# Patient Record
Sex: MALE | Race: WHITE | NOT HISPANIC OR LATINO | URBAN - METROPOLITAN AREA
[De-identification: names, ages, dates, MRNs, and addresses within clinical notes are randomized per-mention and may not be internally consistent; named-entity substitution may affect disease eponyms.]

---

## 2017-04-25 ENCOUNTER — OUTPATIENT (OUTPATIENT)
Dept: OUTPATIENT SERVICES | Facility: HOSPITAL | Age: 62
LOS: 1 days | Discharge: HOME | End: 2017-04-25

## 2017-04-25 DIAGNOSIS — Z90.49 ACQUIRED ABSENCE OF OTHER SPECIFIED PARTS OF DIGESTIVE TRACT: Chronic | ICD-10-CM

## 2017-04-25 DIAGNOSIS — I25.10 ATHEROSCLEROTIC HEART DISEASE OF NATIVE CORONARY ARTERY WITHOUT ANGINA PECTORIS: ICD-10-CM

## 2017-04-25 LAB
ANION GAP SERPL CALC-SCNC: 10 MMOL/L — SIGNIFICANT CHANGE UP (ref 5–17)
BUN SERPL-MCNC: 10 MG/DL — SIGNIFICANT CHANGE UP (ref 7–23)
CALCIUM SERPL-MCNC: 9 MG/DL — SIGNIFICANT CHANGE UP (ref 8.5–10.1)
CHLORIDE SERPL-SCNC: 104 MMOL/L — SIGNIFICANT CHANGE UP (ref 96–108)
CO2 SERPL-SCNC: 27 MMOL/L — SIGNIFICANT CHANGE UP (ref 22–31)
CREAT SERPL-MCNC: 1.02 MG/DL — SIGNIFICANT CHANGE UP (ref 0.5–1.3)
GLUCOSE SERPL-MCNC: 80 MG/DL — SIGNIFICANT CHANGE UP (ref 70–99)
HCT VFR BLD CALC: 45.7 % — SIGNIFICANT CHANGE UP (ref 39–50)
HGB BLD-MCNC: 14.4 G/DL — SIGNIFICANT CHANGE UP (ref 13–17)
MCHC RBC-ENTMCNC: 20.7 PG — LOW (ref 27–34)
MCHC RBC-ENTMCNC: 31.5 GM/DL — LOW (ref 32–36)
MCV RBC AUTO: 65.9 FL — LOW (ref 80–100)
PLATELET # BLD AUTO: 223 K/UL — SIGNIFICANT CHANGE UP (ref 150–400)
POTASSIUM SERPL-MCNC: 3.9 MMOL/L — SIGNIFICANT CHANGE UP (ref 3.5–5.3)
POTASSIUM SERPL-SCNC: 3.9 MMOL/L — SIGNIFICANT CHANGE UP (ref 3.5–5.3)
RBC # BLD: 6.94 M/UL — HIGH (ref 4.2–5.8)
RBC # FLD: 13.5 % — SIGNIFICANT CHANGE UP (ref 10.3–14.5)
SODIUM SERPL-SCNC: 141 MMOL/L — SIGNIFICANT CHANGE UP (ref 135–145)
WBC # BLD: 9.3 K/UL — SIGNIFICANT CHANGE UP (ref 3.8–10.5)
WBC # FLD AUTO: 9.3 K/UL — SIGNIFICANT CHANGE UP (ref 3.8–10.5)

## 2017-04-25 NOTE — H&P CARDIOLOGY - PMH
COPD (chronic obstructive pulmonary disease)    Diverticulitis    Gout    Kidney stone    Sleep apnea

## 2017-04-25 NOTE — H&P CARDIOLOGY - HISTORY OF PRESENT ILLNESS
61yr old male PMH COPD, gout, sleep apnea 61yr old male PMH COPD, gout, sleep apnea with c/o left sided chest pain upon exertion. Symptom is  accompanied with dyspnea and palpitations. Pt. reports that this occurs when he walks uphill it feels like "burning across his chest" and subsides with rest. Pt. had a nuclear stress test which was found to be abnormal. Pt. now referred for [facility] for further evaluation. Pt. denies diaphoresis, lightheadedness, dizziness, N&V, chills, fever

## 2017-04-25 NOTE — H&P CARDIOLOGY - FAMILY HISTORY
Father  Still living? No  Family history of ischemic heart disease (IHD), Age at diagnosis: Age Unknown

## 2017-04-25 NOTE — H&P CARDIOLOGY - REVIEW OF SYSTEMS
CONSTITUTIONAL: No fever, weight loss, or fatigue  EYES: No eye pain, visual disturbances, or discharge  ENMT:  No difficulty hearing, tinnitus, vertigo; No sinus or throat pain  NECK: No pain or stiffness  BREASTS: No pain, masses, or nipple discharge  RESPIRATORY: + shortness of breath, dyspnea  CARDIOVASCULAR:+ chest pain, palpitations  GASTROINTESTINAL: No abdominal or epigastric pain. No nausea, vomiting, or hematemesis; No diarrhea or constipation. No melena or hematochezia.  GENITOURINARY: No dysuria, frequency, hematuria, or incontinence  NEUROLOGICAL: No headaches, memory loss, loss of strength, numbness, or tremors  SKIN: No itching, burning, rashes, or lesions   LYMPH NODES: No enlarged glands  ENDOCRINE: No heat or cold intolerance; No hair loss  MUSCULOSKELETAL: No joint pain or swelling; No muscle, back, or extremity pain  PSYCHIATRIC: No depression, anxiety, mood swings, or difficulty sleeping  HEME/LYMPH: No easy bruising, or bleeding gums  ALLERGY AND IMMUNOLOGIC: No hives or eczema

## 2017-04-25 NOTE — H&P CARDIOLOGY - COMMENTS
61yr old male PMH COPD, gout, sleep apnea with c/o left sided chest pain upon exertion. Symptom is  accompanied with dyspnea and palpitations. Pt. had a nuclear stress test which was found to be abnormal. Pt. now referred for C for further evaluation.  C risks, benefits and alternatives discussed with patient. Risk discussed included, but not limited to MI, stroke, mortality, major bleeding, arrythmia, or infection. Consent obtained and signed educational material provided. Pt. verbalizes and understands pre-procedural instructions.

## 2017-04-27 ENCOUNTER — OUTPATIENT (OUTPATIENT)
Dept: OUTPATIENT SERVICES | Facility: HOSPITAL | Age: 62
LOS: 1 days | Discharge: HOME | End: 2017-04-27

## 2017-04-27 VITALS
HEART RATE: 64 BPM | TEMPERATURE: 97 F | SYSTOLIC BLOOD PRESSURE: 149 MMHG | DIASTOLIC BLOOD PRESSURE: 93 MMHG | OXYGEN SATURATION: 98 % | RESPIRATION RATE: 18 BRPM

## 2017-04-27 VITALS
HEART RATE: 59 BPM | RESPIRATION RATE: 16 BRPM | OXYGEN SATURATION: 98 % | DIASTOLIC BLOOD PRESSURE: 69 MMHG | SYSTOLIC BLOOD PRESSURE: 126 MMHG

## 2017-04-27 DIAGNOSIS — Z90.49 ACQUIRED ABSENCE OF OTHER SPECIFIED PARTS OF DIGESTIVE TRACT: Chronic | ICD-10-CM

## 2017-04-27 LAB — ALLERGY+IMMUNOLOGY DIAG STUDY NOTE: SIGNIFICANT CHANGE UP

## 2017-04-27 RX ORDER — CLOPIDOGREL BISULFATE 75 MG/1
75 TABLET, FILM COATED ORAL DAILY
Qty: 0 | Refills: 0 | Status: DISCONTINUED | OUTPATIENT
Start: 2017-04-27 | End: 2017-05-12

## 2017-04-27 RX ORDER — SODIUM CHLORIDE 9 MG/ML
1000 INJECTION INTRAMUSCULAR; INTRAVENOUS; SUBCUTANEOUS
Qty: 0 | Refills: 0 | Status: DISCONTINUED | OUTPATIENT
Start: 2017-04-27 | End: 2017-05-12

## 2017-04-27 RX ORDER — METOPROLOL TARTRATE 50 MG
25 TABLET ORAL DAILY
Qty: 0 | Refills: 0 | Status: DISCONTINUED | OUTPATIENT
Start: 2017-04-27 | End: 2017-05-12

## 2017-04-27 RX ORDER — ASPIRIN/CALCIUM CARB/MAGNESIUM 324 MG
81 TABLET ORAL DAILY
Qty: 0 | Refills: 0 | Status: DISCONTINUED | OUTPATIENT
Start: 2017-04-27 | End: 2017-04-27

## 2017-04-27 RX ORDER — ALLOPURINOL 300 MG
300 TABLET ORAL DAILY
Qty: 0 | Refills: 0 | Status: DISCONTINUED | OUTPATIENT
Start: 2017-04-27 | End: 2017-05-12

## 2017-04-27 RX ORDER — ATORVASTATIN CALCIUM 80 MG/1
20 TABLET, FILM COATED ORAL AT BEDTIME
Qty: 0 | Refills: 0 | Status: DISCONTINUED | OUTPATIENT
Start: 2017-04-27 | End: 2017-05-12

## 2017-04-27 RX ORDER — ASPIRIN/CALCIUM CARB/MAGNESIUM 324 MG
81 TABLET ORAL DAILY
Qty: 0 | Refills: 0 | Status: DISCONTINUED | OUTPATIENT
Start: 2017-04-27 | End: 2017-05-12

## 2017-04-27 RX ORDER — RANOLAZINE 500 MG/1
500 TABLET, FILM COATED, EXTENDED RELEASE ORAL
Qty: 0 | Refills: 0 | Status: DISCONTINUED | OUTPATIENT
Start: 2017-04-27 | End: 2017-05-12

## 2017-04-27 RX ADMIN — SODIUM CHLORIDE 100 MILLILITER(S): 9 INJECTION INTRAMUSCULAR; INTRAVENOUS; SUBCUTANEOUS at 11:08

## 2017-04-27 NOTE — PROGRESS NOTE ADULT - SUBJECTIVE AND OBJECTIVE BOX
Cardiology NP     Patient is a 61yr old  Male who presents with a chief complaint of Chest pressure and SOB on stress/ echo    HPI: This is a 61 y/r male  with Hx of HTN, HLD , + stress/echo for chest pain and SOB      PAST MEDICAL & SURGICAL HISTORY:  Diverticulitis  Sleep apnea  Kidney stone  Gout  COPD (chronic obstructive pulmonary disease)  H/O colectomy  S/P cholecystectomy      MEDICATIONS  (STANDING):  aspirin  Oral Tab/Cap - Peds 81milliGRAM(s) Oral daily  ranolazine 500milliGRAM(s) Oral two times a day  allopurinol 300milliGRAM(s) Oral daily  atorvastatin 20milliGRAM(s) Oral at bedtime  clopidogrel Tablet 75milliGRAM(s) Oral daily  metoprolol succinate ER 25milliGRAM(s) Oral daily  sodium chloride 0.9%. 1000milliLiter(s) IV Continuous <Continuous>    MEDICATIONS  (PRN):      Allergies    No Known Allergies      REVIEW OF SYSTEMS: As mentioned in HPI all others Negative     Vital Signs Last 24 Hrs  T(C): 36.2, Max: 36.2 (04-27 @ 07:30)  T(F): 97.2, Max: 97.2 (04-27 @ 07:30)  HR: 50 (50 - 64)  BP: 113/63 (113/63 - 149/93)  BP(mean): --  RR: 16 (16 - 18)  SpO2: 98% (98% - 98%)    PHYSICAL EXAM:  NERVOUS SYSTEM:  Alert & Oriented X3, Good concentration  CHEST/LUNG: Clear to auscultation bilaterally; No rales, rhonchi, wheezing, or rubs  HEART: Regular rate and rhythm; No murmurs, rubs, or gallops  ABDOMEN: Soft, Nontender, Nondistended; Bowel sounds present  EXTREMITIES:  2+ Peripheral Pulses, No clubbing, cyanosis, or edema  SKIN: Right femoral cath site with Perclose device ,site without bleeding or hematoma    LABS:                        14.4   9.3   )-----------( 223      ( 25 Apr 2017 16:15 )             45.7     04-25    141  |  104  |  10  ----------------------------<  80  3.9   |  27  |  1.02    Ca    9.0      25 Apr 2017 16:15

## 2017-04-27 NOTE — PROGRESS NOTE ADULT - ASSESSMENT
HPI:        Patient now s/p angiogram  with PCI and PAT to the LAD    - monitor in cath lab  - IV hydration  - AM labs and EKG if needed  - procedure, outcome and f/u care reviewed with patient/MD  - continue ASA/ Plavix  - continue statin/ b'blocker  - possible DC home today if stable  - f/u with MD in 4-7 days

## 2017-05-06 DIAGNOSIS — E66.9 OBESITY, UNSPECIFIED: ICD-10-CM

## 2017-05-06 DIAGNOSIS — J44.9 CHRONIC OBSTRUCTIVE PULMONARY DISEASE, UNSPECIFIED: ICD-10-CM

## 2017-05-06 DIAGNOSIS — I25.110 ATHEROSCLEROTIC HEART DISEASE OF NATIVE CORONARY ARTERY WITH UNSTABLE ANGINA PECTORIS: ICD-10-CM

## 2017-05-06 DIAGNOSIS — E78.5 HYPERLIPIDEMIA, UNSPECIFIED: ICD-10-CM

## 2017-05-06 DIAGNOSIS — R07.89 OTHER CHEST PAIN: ICD-10-CM

## 2017-05-06 DIAGNOSIS — I10 ESSENTIAL (PRIMARY) HYPERTENSION: ICD-10-CM

## 2017-05-06 DIAGNOSIS — Z82.49 FAMILY HISTORY OF ISCHEMIC HEART DISEASE AND OTHER DISEASES OF THE CIRCULATORY SYSTEM: ICD-10-CM

## 2017-05-06 DIAGNOSIS — G47.30 SLEEP APNEA, UNSPECIFIED: ICD-10-CM

## 2017-05-06 DIAGNOSIS — M10.9 GOUT, UNSPECIFIED: ICD-10-CM

## 2017-07-10 ENCOUNTER — MEDICATION RENEWAL (OUTPATIENT)
Age: 62
End: 2017-07-10

## 2017-07-27 ENCOUNTER — MOBILE ON CALL (OUTPATIENT)
Age: 62
End: 2017-07-27

## 2017-07-27 ENCOUNTER — RX RENEWAL (OUTPATIENT)
Age: 62
End: 2017-07-27

## 2017-08-01 ENCOUNTER — APPOINTMENT (OUTPATIENT)
Dept: RHEUMATOLOGY | Facility: CLINIC | Age: 62
End: 2017-08-01

## 2017-10-18 ENCOUNTER — APPOINTMENT (OUTPATIENT)
Dept: RHEUMATOLOGY | Facility: CLINIC | Age: 62
End: 2017-10-18
Payer: COMMERCIAL

## 2017-10-18 VITALS
HEIGHT: 68 IN | WEIGHT: 223 LBS | OXYGEN SATURATION: 98 % | SYSTOLIC BLOOD PRESSURE: 128 MMHG | DIASTOLIC BLOOD PRESSURE: 72 MMHG | BODY MASS INDEX: 33.8 KG/M2 | HEART RATE: 55 BPM

## 2017-10-18 PROCEDURE — 99213 OFFICE O/P EST LOW 20 MIN: CPT

## 2017-10-18 RX ORDER — METOPROLOL SUCCINATE 25 MG/1
25 TABLET, EXTENDED RELEASE ORAL
Qty: 30 | Refills: 0 | Status: ACTIVE | COMMUNITY
Start: 2017-04-10

## 2017-10-18 RX ORDER — ATORVASTATIN CALCIUM 20 MG/1
20 TABLET, FILM COATED ORAL
Qty: 30 | Refills: 0 | Status: ACTIVE | COMMUNITY
Start: 2017-05-01

## 2017-11-03 ENCOUNTER — LABORATORY RESULT (OUTPATIENT)
Age: 62
End: 2017-11-03

## 2017-11-06 LAB
ALBUMIN SERPL ELPH-MCNC: 4.4 G/DL
ALP BLD-CCNC: 84 U/L
ALT SERPL-CCNC: 27 U/L
ANION GAP SERPL CALC-SCNC: 18 MMOL/L
AST SERPL-CCNC: 26 U/L
BASOPHILS # BLD AUTO: 0.02 K/UL
BASOPHILS NFR BLD AUTO: 0.3 %
BILIRUB SERPL-MCNC: 2.6 MG/DL
BUN SERPL-MCNC: 10 MG/DL
CALCIUM SERPL-MCNC: 9.8 MG/DL
CHLORIDE SERPL-SCNC: 101 MMOL/L
CO2 SERPL-SCNC: 23 MMOL/L
CREAT SERPL-MCNC: 0.94 MG/DL
EOSINOPHIL # BLD AUTO: 0.5 K/UL
EOSINOPHIL NFR BLD AUTO: 6.5 %
GLUCOSE SERPL-MCNC: 73 MG/DL
HCT VFR BLD CALC: 41.3 %
HGB BLD-MCNC: 13.5 G/DL
IMM GRANULOCYTES NFR BLD AUTO: 0.3 %
LYMPHOCYTES # BLD AUTO: 1.47 K/UL
LYMPHOCYTES NFR BLD AUTO: 19.1 %
MAN DIFF?: NORMAL
MCHC RBC-ENTMCNC: 20.7 PG
MCHC RBC-ENTMCNC: 32.7 GM/DL
MCV RBC AUTO: 63.3 FL
MONOCYTES # BLD AUTO: 0.68 K/UL
MONOCYTES NFR BLD AUTO: 8.8 %
NEUTROPHILS # BLD AUTO: 5.02 K/UL
NEUTROPHILS NFR BLD AUTO: 65 %
PLATELET # BLD AUTO: 194 K/UL
POTASSIUM SERPL-SCNC: 3.9 MMOL/L
PROT SERPL-MCNC: 7.9 G/DL
RBC # BLD: 6.52 M/UL
RBC # FLD: 16.1 %
SODIUM SERPL-SCNC: 142 MMOL/L
URATE SERPL-MCNC: 4.2 MG/DL
WBC # FLD AUTO: 7.71 K/UL

## 2018-01-24 ENCOUNTER — APPOINTMENT (OUTPATIENT)
Dept: CT IMAGING | Facility: CLINIC | Age: 63
End: 2018-01-24
Payer: COMMERCIAL

## 2018-01-24 ENCOUNTER — OUTPATIENT (OUTPATIENT)
Dept: OUTPATIENT SERVICES | Facility: HOSPITAL | Age: 63
LOS: 1 days | End: 2018-01-24
Payer: COMMERCIAL

## 2018-01-24 DIAGNOSIS — Z90.49 ACQUIRED ABSENCE OF OTHER SPECIFIED PARTS OF DIGESTIVE TRACT: Chronic | ICD-10-CM

## 2018-01-24 DIAGNOSIS — Z00.8 ENCOUNTER FOR OTHER GENERAL EXAMINATION: ICD-10-CM

## 2018-01-24 PROCEDURE — 74178 CT ABD&PLV WO CNTR FLWD CNTR: CPT | Mod: 26

## 2018-01-24 PROCEDURE — 74178 CT ABD&PLV WO CNTR FLWD CNTR: CPT

## 2018-10-17 ENCOUNTER — APPOINTMENT (OUTPATIENT)
Dept: RHEUMATOLOGY | Facility: CLINIC | Age: 63
End: 2018-10-17
Payer: COMMERCIAL

## 2018-10-17 VITALS
SYSTOLIC BLOOD PRESSURE: 124 MMHG | WEIGHT: 223 LBS | HEART RATE: 75 BPM | OXYGEN SATURATION: 97 % | DIASTOLIC BLOOD PRESSURE: 72 MMHG | HEIGHT: 68 IN | BODY MASS INDEX: 33.8 KG/M2

## 2018-10-17 PROCEDURE — 99213 OFFICE O/P EST LOW 20 MIN: CPT

## 2018-10-18 PROBLEM — J44.9 CHRONIC OBSTRUCTIVE PULMONARY DISEASE, UNSPECIFIED: Chronic | Status: ACTIVE | Noted: 2017-04-25

## 2018-10-18 PROBLEM — M10.9 GOUT, UNSPECIFIED: Chronic | Status: ACTIVE | Noted: 2017-04-25

## 2018-10-18 PROBLEM — K57.92 DIVERTICULITIS OF INTESTINE, PART UNSPECIFIED, WITHOUT PERFORATION OR ABSCESS WITHOUT BLEEDING: Chronic | Status: ACTIVE | Noted: 2017-04-25

## 2018-10-18 PROBLEM — G47.30 SLEEP APNEA, UNSPECIFIED: Chronic | Status: ACTIVE | Noted: 2017-04-25

## 2018-10-18 PROBLEM — N20.0 CALCULUS OF KIDNEY: Chronic | Status: ACTIVE | Noted: 2017-04-25

## 2019-12-11 ENCOUNTER — APPOINTMENT (OUTPATIENT)
Dept: RHEUMATOLOGY | Facility: CLINIC | Age: 64
End: 2019-12-11
Payer: COMMERCIAL

## 2019-12-11 VITALS
WEIGHT: 219 LBS | HEIGHT: 68 IN | SYSTOLIC BLOOD PRESSURE: 150 MMHG | BODY MASS INDEX: 33.19 KG/M2 | OXYGEN SATURATION: 99 % | HEART RATE: 67 BPM | DIASTOLIC BLOOD PRESSURE: 90 MMHG

## 2019-12-11 PROCEDURE — 99213 OFFICE O/P EST LOW 20 MIN: CPT

## 2019-12-11 NOTE — PHYSICAL EXAM
[Extraocular Movements] : extraocular movements were intact [PERRL With Normal Accommodation] : pupils were equal in size, round, and reactive to light [Sclera] : the sclera and conjunctiva were normal [Neck Appearance] : the appearance of the neck was normal [Jugular Venous Distention Increased] : there was no jugular-venous distention [Thyroid Diffuse Enlargement] : the thyroid was not enlarged [Neck Cervical Mass (___cm)] : no neck mass was observed [Thyroid Nodule] : there were no palpable thyroid nodules [Edema] : there was no peripheral edema [Full Pulse] : the pedal pulses are present [Abdomen Tenderness] : non-tender [Abdomen Soft] : soft [Bowel Sounds] : normal bowel sounds [] : no hepato-splenomegaly [Abdomen Mass (___ Cm)] : no abdominal mass palpated [Cervical Lymph Nodes Enlarged Posterior Bilaterally] : posterior cervical [Supraclavicular Lymph Nodes Enlarged Bilaterally] : supraclavicular [Cervical Lymph Nodes Enlarged Anterior Bilaterally] : anterior cervical [No CVA Tenderness] : no ~M costovertebral angle tenderness [Axillary Lymph Nodes Enlarged Bilaterally] : axillary [No Spinal Tenderness] : no spinal tenderness [Musculoskeletal - Swelling] : no joint swelling seen [Abnormal Walk] : normal gait [Motor Tone] : muscle strength and tone were normal [FreeTextEntry1] : FROM neck, shoulders, elbows, wrists, hands, hips, knees, ankles and feet, including the small joints of the hands and feet without any evidence of inflammatory arthritis , no tophi noted [Sensation] : the sensory exam was normal to light touch and pinprick [Deep Tendon Reflexes (DTR)] : deep tendon reflexes were 2+ and symmetric [No Focal Deficits] : no focal deficits [Oriented To Time, Place, And Person] : oriented to person, place, and time [Impaired Insight] : insight and judgment were intact [Affect] : the affect was normal

## 2019-12-11 NOTE — ASSESSMENT
[FreeTextEntry1] : 64-year-old  male with a history of coronary artery disease on Plavix, presumed gout, history of kidney stones presents for routine visit today. He returns after one year today. He has been doing reasonably well for this past year.\par \par Gout-\par He is on allopurinol 300 mg daily. There has been no gout flare at least for the past 2-3 years. He is to continue with the medication. He is to obtain labs for me today including a uric acid and a chemistry panel.\par \par Kidney stones-\par He has a remote history of kidney stones and another bout about a year ago. He has not had any episodes of kidney stones in the past year.\par \par Stressed importance of being compliant with medication use and continuing with the allopurinol to prevent a gout flare. He is aware to call the office if his gout flares. Followup one year unless there is any issues or change in his health that he needs to notify us about.

## 2019-12-11 NOTE — HISTORY OF PRESENT ILLNESS
[FreeTextEntry1] : Joel is a 64-year-old white male with gout on allopurinol 300 mg for at least 4-5 years. He has not had a gout attack for at least 2-1/2 years. \par Returns for follow up after one year. He denies any kidney stones in this past year. \par As far as his joints are concerned he feels good. He denies joint pains or prolonged morning stiffness. He denies any gout flares. Currently he rates his joint pain and a zero or 1/10. He does not exercise on a regular basis.\par \par He has a good appetite.

## 2020-03-06 NOTE — ASU PATIENT PROFILE, ADULT - REASON FOR ADMISSION, PROFILE
[facility] - Outpatient Consultation  Hope Sibley 60 y o  male MRN: [id number]  Encounter: [id number]          ASSESSMENT AND PLAN:      1  Colon cancer screening  Patient is due for colonoscopy  Schedule colonoscopy  Patient was counseled on the benefits and risks of endoscopic intervention including but not limited to bleeding, infection, bowel perforation  Patient also understands colonoscopy is not 100% sensitive to detect polyps  - polyethylene glycol (GOLYTELY) 4000 mL solution; Take 4,000 mL by mouth once for 1 dose  Dispense: 4000 mL; Refill: 0  - bisacodyl (DULCOLAX) 5 mg EC tablet; Take 2 tablets (10 mg total) by mouth once for 1 dose  Dispense: 2 tablet; Refill: 0  - Colonoscopy; Future    2  Fatty liver    His liver enzymes were normal   Fatty liver likely secondary to long-time alcohol use  He was counseled on importance of alcohol abstinence     ______________________________________________________________________    HPI:      18-year-old man with past medical history of COPD, CKD referred for evaluation of colon cancer screening  Patient reported he does not remember well as last time he had a colonoscopy  He said probably 10 to 20 years ago  He reported colonoscopy results at that time was normal   He reported overall regular formed bowel movement  Denies constipation or diarrhea  Denies blood in the stool  Denies family history of colon cancer  One of his brother  of pancreatic cancer  He had a CT scan in 2017 showing unremarkable pancreas  He continues to smoke cigarettes  He drinks 3 to 4 beers a night every night  His most recent lab works showed normal liver enzymes  REVIEW OF SYSTEMS:    CONSTITUTIONAL: Denies any fever, chills, rigors, and weight loss  HEENT: No earache or tinnitus  Denies hearing loss or visual disturbances  CARDIOVASCULAR: No chest pain or palpitations     RESPIRATORY: Denies any cough, hemoptysis, shortness of breath or dyspnea on exertion  GASTROINTESTINAL: As noted in the History of Present Illness  GENITOURINARY: No problems with urination  Denies any hematuria or dysuria  NEUROLOGIC: No dizziness or vertigo, denies headaches  MUSCULOSKELETAL: Denies any muscle or joint pain  SKIN: Denies skin rashes or itching  ENDOCRINE: Denies excessive thirst  Denies intolerance to heat or cold  PSYCHOSOCIAL: Denies depression or anxiety  Denies any recent memory loss  Historical Information   Past Medical History:   Diagnosis Date    COPD (chronic obstructive pulmonary disease) (HCC)     Fibromyalgia     GERD (gastroesophageal reflux disease)     Hyperlipidemia      Past Surgical History:   Procedure Laterality Date    ANKLE FRACTURE SURGERY      Left Side    CHOLECYSTECTOMY      CYST REMOVAL      FEMUR SURGERY      KNEE SURGERY      ROTATOR CUFF REPAIR      WRIST FRACTURE SURGERY Left      Social History   Social History     Substance and Sexual Activity   Alcohol Use Yes    Comment: occasional      Social History     Substance and Sexual Activity   Drug Use No     Social History     Tobacco Use   Smoking Status Current Every Day Smoker    Packs/day: 0 50   Smokeless Tobacco Never Used     No family history on file      Meds/Allergies       Current Outpatient Medications:     ALBUTEROL IN    cetirizine (ZyrTEC) 10 mg tablet    Cholecalciferol (VITAMIN D3) 125 MCG (5000 UT) TABS    fenofibrate (TRICOR) 145 mg tablet    fluticasone (FLONASE) 50 mcg/act nasal spray    fluticasone-umeclidinium-vilanterol (TRELEGY ELLIPTA) 100-62 5-25 MCG/INH inhaler    guaiFENesin (MUCINEX) 600 mg 12 hr tablet    ibuprofen (MOTRIN) 600 mg tablet    Ibuprofen-Famotidine 800-26 6 MG TABS    Icosapent Ethyl (VASCEPA) 0 5 g CAPS    multivitamin (THERAGRAN) TABS    nicotine (NICODERM CQ) 14 mg/24hr TD 24 hr patch    omeprazole (PriLOSEC) 20 mg delayed release capsule    pregabalin (LYRICA) 150 mg capsule   benzonatate (TESSALON PERLES) 100 mg capsule    bisacodyl (DULCOLAX) 5 mg EC tablet    polyethylene glycol (GOLYTELY) 4000 mL solution    predniSONE 10 mg tablet    rosuvastatin (CRESTOR) 10 MG tablet    No Known Allergies        Objective     Blood pressure 128/72, pulse 104, temperature 97 5 °F (36 4 °C), height 5' 8" (1 727 m), weight 75 3 kg (166 lb)  Body mass index is 25 24 kg/m²  PHYSICAL EXAM:      General Appearance:   Alert, cooperative, no distress   HEENT:   Normocephalic, atraumatic, anicteric      Neck:  Supple, symmetrical, trachea midline   Lungs:   Clear to auscultation bilaterally; no rales, rhonchi or wheezing; respirations unlabored    Heart[de-identified]   Regular rate and rhythm; no murmur, rub, or gallop  Abdomen:   Soft, non-tender, non-distended; normal bowel sounds; no masses, no organomegaly    Genitalia:   Deferred    Rectal:   Deferred    Extremities:  No cyanosis, clubbing or edema    Pulses:  2+ and symmetric    Skin:  No jaundice, rashes, or lesions    Lymph nodes:  No palpable cervical lymphadenopathy        Lab Results:   No visits with results within 1 Day(s) from this visit     Latest known visit with results is:   Admission on 02/09/2020, Discharged on 02/11/2020   Component Date Value    WBC 02/09/2020 10 56*    RBC 02/09/2020 4 32     Hemoglobin 02/09/2020 13 3     Hematocrit 02/09/2020 40 3     MCV 02/09/2020 93     MCH 02/09/2020 30 8     MCHC 02/09/2020 33 0     RDW 02/09/2020 12 6     MPV 02/09/2020 12 5     Platelets 17/93/4436 235     nRBC 02/09/2020 0     Neutrophils Relative 02/09/2020 77*    Immat GRANS % 02/09/2020 1     Lymphocytes Relative 02/09/2020 9*    Monocytes Relative 02/09/2020 11     Eosinophils Relative 02/09/2020 1     Basophils Relative 02/09/2020 1     Neutrophils Absolute 02/09/2020 8 29*    Immature Grans Absolute 02/09/2020 0 07     Lymphocytes Absolute 02/09/2020 0 91     Monocytes Absolute 02/09/2020 1 14     Eosinophils Absolute 02/09/2020 0 08     Basophils Absolute 02/09/2020 0 07     Protime 02/09/2020 13 3     INR 02/09/2020 1 00     PTT 02/09/2020 26     Sodium 02/09/2020 136     Potassium 02/09/2020 4 1     Chloride 02/09/2020 100     CO2 02/09/2020 24     ANION GAP 02/09/2020 12     BUN 02/09/2020 19     Creatinine 02/09/2020 1 38*    Glucose 02/09/2020 140     Calcium 02/09/2020 9 2     AST 02/09/2020 25     ALT 02/09/2020 39     Alkaline Phosphatase 02/09/2020 78     Total Protein 02/09/2020 8 4*    Albumin 02/09/2020 4 1     Total Bilirubin 02/09/2020 0 51     eGFR 02/09/2020 54     Troponin I 02/09/2020 <0 02     NT-proBNP 02/09/2020 162*    INFLUENZA A PCR 02/09/2020 None Detected     INFLUENZA B PCR 02/09/2020 None Detected     RSV PCR 02/09/2020 None Detected     LACTIC ACID 02/09/2020 1 8     Lipase 02/09/2020 213     Blood Culture 02/09/2020 No Growth After 5 Days   Blood Culture 02/09/2020 No Growth After 5 Days       Platelets 08/67/2593 201     MPV 02/09/2020 12 2     Procalcitonin 02/09/2020 0 11     Ventricular Rate 02/09/2020 110     Atrial Rate 02/09/2020 110     WV Interval 02/09/2020 138     QRSD Interval 02/09/2020 80     QT Interval 02/09/2020 298     QTC Interval 02/09/2020 403     P Axis 02/09/2020 35     QRS Axis 02/09/2020 81     T Wave Axis 02/09/2020 57     Troponin I 02/09/2020 <0 02     Troponin I 02/10/2020 <0 02     Troponin I 02/09/2020 <0 02     Sodium 02/10/2020 140     Potassium 02/10/2020 4 1     Chloride 02/10/2020 104     CO2 02/10/2020 25     ANION GAP 02/10/2020 11     BUN 02/10/2020 23     Creatinine 02/10/2020 1 35*    Glucose 02/10/2020 194*    Calcium 02/10/2020 8 8     eGFR 02/10/2020 55     Magnesium 02/10/2020 2 2     WBC 02/10/2020 8 58     RBC 02/10/2020 3 98     Hemoglobin 02/10/2020 12 0     Hematocrit 02/10/2020 37 7     MCV 02/10/2020 95     MCH 02/10/2020 30 2     MCHC 02/10/2020 31 8     RDW 02/10/2020 12 7  MPV 02/10/2020 12 9*    Platelets 13/88/1052 225     nRBC 02/10/2020 0     Neutrophils Relative 02/10/2020 86*    Immat GRANS % 02/10/2020 1     Lymphocytes Relative 02/10/2020 8*    Monocytes Relative 02/10/2020 5     Eosinophils Relative 02/10/2020 0     Basophils Relative 02/10/2020 0     Neutrophils Absolute 02/10/2020 7 28     Immature Grans Absolute 02/10/2020 0 12     Lymphocytes Absolute 02/10/2020 0 72     Monocytes Absolute 02/10/2020 0 44     Eosinophils Absolute 02/10/2020 0 00     Basophils Absolute 02/10/2020 0 02     Procalcitonin 02/10/2020 0 10     Ventricular Rate 02/09/2020 118     Atrial Rate 02/09/2020 118     RI Interval 02/09/2020 152     QRSD Interval 02/09/2020 84     QT Interval 02/09/2020 308     QTC Interval 02/09/2020 431     P Axis 02/09/2020 63     QRS Axis 02/09/2020 77     T Wave Axis 02/09/2020 53     WBC 02/11/2020 18 65*    RBC 02/11/2020 3 59*    Hemoglobin 02/11/2020 11 0*    Hematocrit 02/11/2020 34 0*    MCV 02/11/2020 95     MCH 02/11/2020 30 6     MCHC 02/11/2020 32 4     RDW 02/11/2020 13 2     MPV 02/11/2020 12 8*    Platelets 18/15/7902 225     nRBC 02/11/2020 0     Neutrophils Relative 02/11/2020 84*    Immat GRANS % 02/11/2020 1     Lymphocytes Relative 02/11/2020 7*    Monocytes Relative 02/11/2020 8     Eosinophils Relative 02/11/2020 0     Basophils Relative 02/11/2020 0     Neutrophils Absolute 02/11/2020 15 69*    Immature Grans Absolute 02/11/2020 0 19     Lymphocytes Absolute 02/11/2020 1 33     Monocytes Absolute 02/11/2020 1 41*    Eosinophils Absolute 02/11/2020 0 00     Basophils Absolute 02/11/2020 0 03     Sodium 02/11/2020 141     Potassium 02/11/2020 4 0     Chloride 02/11/2020 104     CO2 02/11/2020 26     ANION GAP 02/11/2020 11     BUN 02/11/2020 21     Creatinine 02/11/2020 1 08     Glucose 02/11/2020 144*    Calcium 02/11/2020 8 8     eGFR 02/11/2020 73          Radiology Results:   [facility] Chest 2 Views    Result Date: 2/9/2020  Narrative: CHEST INDICATION:   Cough, Fever COMPARISON: Chest radiograph from 12/16/2019 and chest CT from 2/17/2019  EXAM PERFORMED/VIEWS:  XR CHEST PA & LATERAL WITH DUAL ENERGY SUBTRACTION  FINDINGS: Cardiomediastinal silhouette is normal  Left lower lobe pneumonia  No effusion or pneumothorax  Redemonstration of biapical scar  Osseous structures are within normal limits for age  Impression: Left lower lobe pneumonia  The study was marked in [facility] for immediate notification   Workstation performed: [id number]

## 2020-07-16 ENCOUNTER — OUTPATIENT (OUTPATIENT)
Dept: OUTPATIENT SERVICES | Facility: HOSPITAL | Age: 65
LOS: 1 days | End: 2020-07-16
Payer: COMMERCIAL

## 2020-07-16 ENCOUNTER — APPOINTMENT (OUTPATIENT)
Dept: RADIOLOGY | Facility: CLINIC | Age: 65
End: 2020-07-16
Payer: COMMERCIAL

## 2020-07-16 DIAGNOSIS — Z00.8 ENCOUNTER FOR OTHER GENERAL EXAMINATION: ICD-10-CM

## 2020-07-16 DIAGNOSIS — Z90.49 ACQUIRED ABSENCE OF OTHER SPECIFIED PARTS OF DIGESTIVE TRACT: Chronic | ICD-10-CM

## 2020-07-16 PROCEDURE — 73130 X-RAY EXAM OF HAND: CPT | Mod: 26,RT

## 2020-07-16 PROCEDURE — 73130 X-RAY EXAM OF HAND: CPT

## 2020-08-01 ENCOUNTER — INPATIENT (INPATIENT)
Facility: HOSPITAL | Age: 65
LOS: 6 days | Discharge: HOME HEALTH | DRG: 949 | End: 2020-08-08
Attending: PHYSICAL MEDICINE & REHABILITATION | Admitting: PHYSICAL MEDICINE & REHABILITATION
Payer: COMMERCIAL

## 2020-08-01 VITALS
RESPIRATION RATE: 18 BRPM | HEART RATE: 75 BPM | TEMPERATURE: 98 F | SYSTOLIC BLOOD PRESSURE: 112 MMHG | DIASTOLIC BLOOD PRESSURE: 69 MMHG | OXYGEN SATURATION: 99 %

## 2020-08-01 DIAGNOSIS — I60.9 NONTRAUMATIC SUBARACHNOID HEMORRHAGE, UNSPECIFIED: ICD-10-CM

## 2020-08-01 DIAGNOSIS — Z90.49 ACQUIRED ABSENCE OF OTHER SPECIFIED PARTS OF DIGESTIVE TRACT: Chronic | ICD-10-CM

## 2020-08-01 RX ORDER — ONDANSETRON 8 MG/1
4 TABLET, FILM COATED ORAL EVERY 6 HOURS
Refills: 0 | Status: DISCONTINUED | OUTPATIENT
Start: 2020-08-01 | End: 2020-08-08

## 2020-08-01 RX ORDER — SODIUM CHLORIDE 9 MG/ML
1 INJECTION INTRAMUSCULAR; INTRAVENOUS; SUBCUTANEOUS THREE TIMES A DAY
Refills: 0 | Status: DISCONTINUED | OUTPATIENT
Start: 2020-08-01 | End: 2020-08-06

## 2020-08-01 RX ORDER — INSULIN LISPRO 100/ML
VIAL (ML) SUBCUTANEOUS AT BEDTIME
Refills: 0 | Status: DISCONTINUED | OUTPATIENT
Start: 2020-08-01 | End: 2020-08-02

## 2020-08-01 RX ORDER — ERYTHROMYCIN BASE 5 MG/GRAM
1 OINTMENT (GRAM) OPHTHALMIC (EYE)
Refills: 0 | Status: DISCONTINUED | OUTPATIENT
Start: 2020-08-01 | End: 2020-08-08

## 2020-08-01 RX ORDER — METOPROLOL TARTRATE 50 MG
12.5 TABLET ORAL
Refills: 0 | Status: DISCONTINUED | OUTPATIENT
Start: 2020-08-01 | End: 2020-08-08

## 2020-08-01 RX ORDER — INSULIN LISPRO 100/ML
VIAL (ML) SUBCUTANEOUS
Refills: 0 | Status: DISCONTINUED | OUTPATIENT
Start: 2020-08-01 | End: 2020-08-02

## 2020-08-01 RX ORDER — DEXTROSE 50 % IN WATER 50 %
25 SYRINGE (ML) INTRAVENOUS ONCE
Refills: 0 | Status: DISCONTINUED | OUTPATIENT
Start: 2020-08-01 | End: 2020-08-02

## 2020-08-01 RX ORDER — SODIUM CHLORIDE 9 MG/ML
1000 INJECTION, SOLUTION INTRAVENOUS
Refills: 0 | Status: DISCONTINUED | OUTPATIENT
Start: 2020-08-01 | End: 2020-08-02

## 2020-08-01 RX ORDER — LOSARTAN POTASSIUM 100 MG/1
50 TABLET, FILM COATED ORAL DAILY
Refills: 0 | Status: DISCONTINUED | OUTPATIENT
Start: 2020-08-01 | End: 2020-08-08

## 2020-08-01 RX ORDER — LANOLIN ALCOHOL/MO/W.PET/CERES
3 CREAM (GRAM) TOPICAL AT BEDTIME
Refills: 0 | Status: DISCONTINUED | OUTPATIENT
Start: 2020-08-01 | End: 2020-08-08

## 2020-08-01 RX ORDER — HEPARIN SODIUM 5000 [USP'U]/ML
5000 INJECTION INTRAVENOUS; SUBCUTANEOUS EVERY 8 HOURS
Refills: 0 | Status: DISCONTINUED | OUTPATIENT
Start: 2020-08-01 | End: 2020-08-08

## 2020-08-01 RX ORDER — POLYETHYLENE GLYCOL 3350 17 G/17G
17 POWDER, FOR SOLUTION ORAL DAILY
Refills: 0 | Status: DISCONTINUED | OUTPATIENT
Start: 2020-08-01 | End: 2020-08-08

## 2020-08-01 RX ORDER — ALLOPURINOL 300 MG
300 TABLET ORAL DAILY
Refills: 0 | Status: DISCONTINUED | OUTPATIENT
Start: 2020-08-01 | End: 2020-08-08

## 2020-08-01 RX ORDER — GLUCAGON INJECTION, SOLUTION 0.5 MG/.1ML
1 INJECTION, SOLUTION SUBCUTANEOUS ONCE
Refills: 0 | Status: DISCONTINUED | OUTPATIENT
Start: 2020-08-01 | End: 2020-08-02

## 2020-08-01 RX ORDER — PANTOPRAZOLE SODIUM 20 MG/1
40 TABLET, DELAYED RELEASE ORAL
Refills: 0 | Status: DISCONTINUED | OUTPATIENT
Start: 2020-08-01 | End: 2020-08-08

## 2020-08-01 RX ORDER — SENNA PLUS 8.6 MG/1
2 TABLET ORAL AT BEDTIME
Refills: 0 | Status: DISCONTINUED | OUTPATIENT
Start: 2020-08-01 | End: 2020-08-08

## 2020-08-01 RX ORDER — ASCORBIC ACID 60 MG
500 TABLET,CHEWABLE ORAL
Refills: 0 | Status: DISCONTINUED | OUTPATIENT
Start: 2020-08-01 | End: 2020-08-08

## 2020-08-01 RX ORDER — DEXTROSE 50 % IN WATER 50 %
15 SYRINGE (ML) INTRAVENOUS ONCE
Refills: 0 | Status: DISCONTINUED | OUTPATIENT
Start: 2020-08-01 | End: 2020-08-02

## 2020-08-01 RX ORDER — LIDOCAINE 4 G/100G
1 CREAM TOPICAL DAILY
Refills: 0 | Status: DISCONTINUED | OUTPATIENT
Start: 2020-08-01 | End: 2020-08-08

## 2020-08-01 RX ORDER — DEXTROSE 50 % IN WATER 50 %
12.5 SYRINGE (ML) INTRAVENOUS ONCE
Refills: 0 | Status: DISCONTINUED | OUTPATIENT
Start: 2020-08-01 | End: 2020-08-02

## 2020-08-01 RX ORDER — ACETAMINOPHEN 500 MG
650 TABLET ORAL EVERY 6 HOURS
Refills: 0 | Status: DISCONTINUED | OUTPATIENT
Start: 2020-08-01 | End: 2020-08-08

## 2020-08-01 RX ORDER — ATORVASTATIN CALCIUM 80 MG/1
20 TABLET, FILM COATED ORAL AT BEDTIME
Refills: 0 | Status: DISCONTINUED | OUTPATIENT
Start: 2020-08-01 | End: 2020-08-08

## 2020-08-01 RX ADMIN — Medication 500 MILLIGRAM(S): at 18:48

## 2020-08-01 RX ADMIN — SODIUM CHLORIDE 1 GRAM(S): 9 INJECTION INTRAMUSCULAR; INTRAVENOUS; SUBCUTANEOUS at 21:49

## 2020-08-01 RX ADMIN — Medication 12.5 MILLIGRAM(S): at 21:49

## 2020-08-01 RX ADMIN — HEPARIN SODIUM 5000 UNIT(S): 5000 INJECTION INTRAVENOUS; SUBCUTANEOUS at 21:49

## 2020-08-01 RX ADMIN — LIDOCAINE 1 PATCH: 4 CREAM TOPICAL at 21:50

## 2020-08-01 RX ADMIN — ATORVASTATIN CALCIUM 20 MILLIGRAM(S): 80 TABLET, FILM COATED ORAL at 21:49

## 2020-08-01 RX ADMIN — PANTOPRAZOLE SODIUM 40 MILLIGRAM(S): 20 TABLET, DELAYED RELEASE ORAL at 18:48

## 2020-08-01 RX ADMIN — Medication 300 MILLIGRAM(S): at 21:48

## 2020-08-01 RX ADMIN — Medication 1 TABLET(S): at 21:49

## 2020-08-01 RX ADMIN — Medication 1 APPLICATION(S): at 18:47

## 2020-08-01 RX ADMIN — Medication 1 TABLET(S): at 18:48

## 2020-08-01 NOTE — H&P ADULT - ASSESSMENT
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  Estimated Disposition: Home with Home Care  ---------------    PRESCREEN COMPARISON:  I have reviewed the prescreen information and I have found no relevant changes between the preadmission screening and my post admission evaluation.    RATIONALE FOR INPATIENT ADMISSION: Patient demonstrates the following:  [X] Medically appropriate for rehabilitation admission  [X] Has attainable rehab goals with an appropriate initial discharge plan  [X]Has rehabilitation potential (expected to make a significant improvement within a reasonable period of time)  [X] Requires close medical management by a rehab physician, rehab nursing care, Hospitalist and comprehensive interdisciplinary team (including PT, OT and/or SLP, Prosthetics and Orthotics)
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
## 2020-08-01 NOTE — H&P ADULT - NSICDXFAMILYHX_GEN_ALL_CORE_FT
FAMILY HISTORY:  Father  Still living? No  Family history of ischemic heart disease (IHD), Age at diagnosis: Age Unknown

## 2020-08-01 NOTE — H&P ADULT - NSHPREVIEWOFSYSTEMS_GEN_ALL_CORE
REVIEW OF SYSTEMS  Constitutional: No fever, No Chills, No fatigue  HEENT: No eye pain, No visual disturbances, No difficulty hearing, No Dysphagia   Pulm: No cough,  No shortness of breath  Cardio: No chest pain, No palpitations  GI:  No abdominal pain, No nausea, No vomiting, No diarrhea, No constipation, No incontinence, Last Bowel Movement on   : No dysuria, No frequency, No hematuria, No incontinence  Neuro: No headaches, No memory loss, No loss of strength, No numbness, No tremors  Skin: No itching, No rashes, No lesions   Endo: No temperature intolerance  MSK: No joint pain, No joint swelling, No muscle pain, No Neck or back pain  Psych:  No depression, No anxiety REVIEW OF SYSTEMS  Constitutional: No fever, No Chills, No fatigue  HEENT: + blurry vision right eye  Pulm: No cough,  No shortness of breath  Cardio: No chest pain, No palpitations  GI:  No abdominal pain, No nausea, No vomiting, No diarrhea, No constipation, No incontinence, Last Bowel Movement on 7/31  : No dysuria, No frequency, No hematuria, No incontinence  Neuro: No headaches, No memory loss, + loss of strength, No numbness, No tremors  Skin: No itching, No rashes, No lesions   Endo: No temperature intolerance  MSK: No joint pain, No joint swelling, No muscle pain, No Neck or back pain  Psych:  No depression, No anxiety

## 2020-08-01 NOTE — H&P ADULT - NSHPPHYSICALEXAM_GEN_ALL_CORE
Vital Signs  T(C): --  HR: --  BP: --  RR: --  SpO2: --    Gen - NAD, Comfortable  HEENT - NCAT, EOMI, MMM  Neck - Supple, No limited ROM  Pulm - CTAB, No wheeze, No rhonchi, No crackles  Cardiovascular - RRR, S1S2, No m/r/g  Abdomen - Soft, NT/ND, +BS  Extremities - No C/C/E, No calf tenderness  Neuro-     Cognitive - AAOx3     Communication - Fluent, No dysarthria     Attention: Intact      Judgement: Good evidence of judgement     Memory: Recall 3 objects immediate and 3 min later         Cranial Nerves - CN 2-12 intact     Motor -                    LEFT    UE - ShAB 5/5, EF 5/5, EE 5/5, WE 5/5,  5/5                    RIGHT UE - ShAB 5/5, EF 5/5, EE 5/5, WE 5/5,  5/5                    LEFT    LE - HF 5/5, KE 5/5, DF 5/5, PF 5/5                    RIGHT LE - HF 5/5, KE 5/5, DF 5/5, PF 5/5        Sensory - Intact to LT     Reflexes - DTR Intact, No primitive reflex     Coordination - FTN intact     Tone - normal  Psychiatric - Mood stable, Affect WNL  Skin: Intact  Wounds: None Present Vital Signs  T(C): --  HR: --  BP: --  RR: --  SpO2: --    Gen - NAD, Comfortable  HEENT - exophthalmos, right periorbital edema. right lower lid soft to touch.   Neck - Supple, No limited ROM  Pulm - CTAB, No wheeze, No rhonchi, No crackles  Cardiovascular - RRR, S1S2, No m/r/g  Abdomen - Soft, NT/ND, +BS  Extremities - No C/C/E, No calf tenderness  Neuro-     Cognitive - AAOx3     Communication - Fluent, No dysarthria     Attention: Intact      Judgement: Good evidence of judgement     Memory: Recall 3 objects immediate and 3 min later         Cranial Nerves - CN 2-12 intact     Motor -                    LEFT    UE - ShAB 5/5, EF 5/5, EE 5/5, WE 5/5,  5/5                    RIGHT UE - ShAB 5/5, EF 5/5, EE 5/5, WE 5/5,  5/5                    LEFT    LE - HF 5/5, KE 5/5, DF 5/5, PF 5/5                    RIGHT LE - HF 5/5, KE 5/5, DF 5/5, PF 5/5        Sensory - Intact to LT     Reflexes - DTR Intact, No primitive reflex     Coordination - FTN intact     Tone - normal  Psychiatric - Mood stable, Affect WNL  Skin: Intact  Wounds: None Present Vital Signs Last 24 Hrs  T(C): 36.7 (01 Aug 2020 15:25), Max: 36.7 (01 Aug 2020 15:25)  T(F): 98.1 (01 Aug 2020 15:25), Max: 98.1 (01 Aug 2020 15:25)  HR: 75 (01 Aug 2020 15:25) (75 - 75)  BP: 112/69 (01 Aug 2020 15:25) (112/69 - 112/69)  BP(mean): 84 (01 Aug 2020 15:25) (84 - 84)  RR: 18 (01 Aug 2020 15:25) (18 - 18)  SpO2: 99% (01 Aug 2020 15:25) (99% - 99%)    Gen - NAD, Comfortable  HEENT - exophthalmos, right periorbital edema. right lower lid soft to touch. Pupils equal and reactive to light. EOMI  Neck - Supple, No limited ROM  Pulm - CTAB, No wheeze, No rhonchi, No crackles  Cardiovascular - RRR, S1S2, No m/r/g  Abdomen - Soft, NT/ND, +BS  Extremities - No C/C/E, No calf tenderness  Neuro-     Cognitive - AAOx3     Communication - Fluent, No dysarthria     Attention: Intact      Judgement: Fair     Memory: Recall 3 objects immediate and 1/3 min later         Cranial Nerves - CN 2-12 intact     Motor -                    LEFT    UE - ShAB 5/5, EF 5/5, EE 5/5, WE 5/5,  5/5                    RIGHT UE - ShAB 5/5, Distal exam defferred due to NWB status, able to wiggle fingers.                    LEFT    LE - HF 5/5, KE 5/5, DF 5/5, PF 5/5                    RIGHT LE - HF 5/5, KE 5/5, DF 5/5, PF 5/5        Sensory - Intact to LT     Reflexes - DTR Intact, No primitive reflex     Coordination - FTN intact     Tone - normal  Psychiatric - Mood stable, Affect WNL  Skin: Scattered Eccymoses  Wounds: None Present

## 2020-08-01 NOTE — H&P ADULT - NSHPSOCIALHISTORY_GEN_ALL_CORE
SOCIAL HISTORY  Smoking - Denied  EtOH - Denied   Drugs - Denied    FUNCTIONAL HISTORY  Lives in a private house with spouse and has 5 stairs to enter with rail on 1 side, 5 Stairs to inside to main level and 5 steps to lower level + Hand Rails on one side.   Prior Level of Function: Independent prior with Ambulation and ADLs.   Occupation:    CURRENT FUNCTIONAL STATUS  - Bed Mobility:   - Transfers:   - Gait:   - ADLs: SOCIAL HISTORY  Smoking - Denied  EtOH - Denied   Drugs - Denied    FUNCTIONAL HISTORY  Lives in a private house with spouse and has 5 stairs to enter with rail on 1 side, 5 Stairs to inside to main level and 5 steps to lower level + Hand Rails on one side.   Prior Level of Function: Independent prior with Ambulation and ADLs.   Occupation:    CURRENT FUNCTIONAL STATUS:   7/31  - Bed Mobility: CG with verbal cues to maintain NWB to RUE  - Transfers:   - Gait: CG, requires occasional verbal cues to negotiate environment  7/29  - ADLs: Grooming Min A, LE dressing Min A  - OT Cog assessment: deficits in selective attention, recall and executive functioning.

## 2020-08-01 NOTE — H&P ADULT - NSICDXPASTMEDICALHX_GEN_ALL_CORE_FT
PAST MEDICAL HISTORY:  COPD (chronic obstructive pulmonary disease)     Diverticulitis     Gout     Kidney stone     Sleep apnea
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
## 2020-08-01 NOTE — H&P ADULT - NSHPLABSRESULTS_GEN_ALL_CORE
7/31/20:  wbc 10.29  hgb 9.5  hct 30.9  plt 291    gluc 103  na 137  k 3.8  bun 17  cr 0.85    7/30, 7/26 and 7/19 covid pcr - not detected    7/19/20 EKG: NSR moderate voltage criteria for LVH may be normal variant.    CT 7/27: Normal CT Head  Right wrist XR 7/23: Nondisplaced intra-articular fracture of the styloid is barely visible of the current examination with splint in place. There is no additional fracture of the elbow.   Right wrist/hand XR 7/22: intra-articular fracture with 2mm overlying edge involving the radial styloid. Small avulsed fractured of the triquetrum seen on dorsal view. Soft tissue swelling about the wrist.   CXR 7/21: bibasilar subsegmental atelectasis. No radiographic evidence of pneumothorax.   CTH 7/20: Evolving multicompartmental hemorrhage. Interval decrease in bilateral occipital horn hemorrhages. Interval decrease in subcutaneous/intraorbital emphysema.   CT thoracic/lumbar spine 7/19: negative for fracture  CTH 7/19: Evolution of extensive intraparenchymal hemorrhage which appears similar in extent given differences in imaging technique.   CTH 7/19: Small right frontal lobe hemorrhagic contusion with diffuse SAH  CT facial bone 7/19: severe right exopthalmos with possible stretching of the optic nerve sheath. Extensive right periorbital and facial soft tissue swelling. Right facial subcutaneous hematoma with contrast blush likely related to active hemorrhage. Inferiorly displaced right orbital floor fracture extending through the intraorbital canal. Bilateral maxillary fluid levels, may represent trauma or sinusitis. Paranasal sinus mucosal changes.   CT C spine 7/19: Negative for fracture  CAP CT 7/19: No acute traumatic pathology of chest, abdomen or pelvis. 0.5cm pulm nodule of FELISHA can be followed with dedicated chest CT in 12 months. Postsurgical changes of rectosigmoid colon with a few adjacent surgical clips in the pelvis. Nonspecific 0.9cm solid nodular density adjacent to one of the surgical clips in the pelvis. This is possibly a lymph node or representative of other postsurgical change. Enlarged prostate gland.   CTH 7/19: New hemorrhagic contusion within left temporal lobe. Mildly increased intraventricular hemorrhage with extension into the left lateral ventricle, bilateral occipital and temporal horns.

## 2020-08-02 LAB
A1C WITH ESTIMATED AVERAGE GLUCOSE RESULT: 5 % — SIGNIFICANT CHANGE UP (ref 4–5.6)
ALBUMIN SERPL ELPH-MCNC: 2.8 G/DL — LOW (ref 3.3–5)
ALP SERPL-CCNC: 71 U/L — SIGNIFICANT CHANGE UP (ref 40–120)
ALT FLD-CCNC: 50 U/L — HIGH (ref 10–45)
ANION GAP SERPL CALC-SCNC: 8 MMOL/L — SIGNIFICANT CHANGE UP (ref 5–17)
AST SERPL-CCNC: 23 U/L — SIGNIFICANT CHANGE UP (ref 10–40)
BASOPHILS # BLD AUTO: 0.04 K/UL — SIGNIFICANT CHANGE UP (ref 0–0.2)
BASOPHILS NFR BLD AUTO: 0.4 % — SIGNIFICANT CHANGE UP (ref 0–2)
BILIRUB SERPL-MCNC: 1.8 MG/DL — HIGH (ref 0.2–1.2)
BUN SERPL-MCNC: 18 MG/DL — SIGNIFICANT CHANGE UP (ref 7–23)
CALCIUM SERPL-MCNC: 8.7 MG/DL — SIGNIFICANT CHANGE UP (ref 8.4–10.5)
CHLORIDE SERPL-SCNC: 105 MMOL/L — SIGNIFICANT CHANGE UP (ref 96–108)
CO2 SERPL-SCNC: 25 MMOL/L — SIGNIFICANT CHANGE UP (ref 22–31)
CREAT SERPL-MCNC: 1 MG/DL — SIGNIFICANT CHANGE UP (ref 0.5–1.3)
EOSINOPHIL # BLD AUTO: 0.14 K/UL — SIGNIFICANT CHANGE UP (ref 0–0.5)
EOSINOPHIL NFR BLD AUTO: 1.3 % — SIGNIFICANT CHANGE UP (ref 0–6)
ESTIMATED AVERAGE GLUCOSE: 97 MG/DL — SIGNIFICANT CHANGE UP (ref 68–114)
GLUCOSE SERPL-MCNC: 92 MG/DL — SIGNIFICANT CHANGE UP (ref 70–99)
HCT VFR BLD CALC: 32.2 % — LOW (ref 39–50)
HCV AB S/CO SERPL IA: 0.15 S/CO — SIGNIFICANT CHANGE UP (ref 0–0.99)
HCV AB SERPL-IMP: SIGNIFICANT CHANGE UP
HGB BLD-MCNC: 9.9 G/DL — LOW (ref 13–17)
IMM GRANULOCYTES NFR BLD AUTO: 0.8 % — SIGNIFICANT CHANGE UP (ref 0–1.5)
LYMPHOCYTES # BLD AUTO: 1.16 K/UL — SIGNIFICANT CHANGE UP (ref 1–3.3)
LYMPHOCYTES # BLD AUTO: 10.7 % — LOW (ref 13–44)
MCHC RBC-ENTMCNC: 20.4 PG — LOW (ref 27–34)
MCHC RBC-ENTMCNC: 30.7 GM/DL — LOW (ref 32–36)
MCV RBC AUTO: 66.4 FL — LOW (ref 80–100)
MONOCYTES # BLD AUTO: 0.79 K/UL — SIGNIFICANT CHANGE UP (ref 0–0.9)
MONOCYTES NFR BLD AUTO: 7.3 % — SIGNIFICANT CHANGE UP (ref 2–14)
NEUTROPHILS # BLD AUTO: 8.58 K/UL — HIGH (ref 1.8–7.4)
NEUTROPHILS NFR BLD AUTO: 79.5 % — HIGH (ref 43–77)
NRBC # BLD: 0 /100 WBCS — SIGNIFICANT CHANGE UP (ref 0–0)
PLATELET # BLD AUTO: 264 K/UL — SIGNIFICANT CHANGE UP (ref 150–400)
POTASSIUM SERPL-MCNC: 3.8 MMOL/L — SIGNIFICANT CHANGE UP (ref 3.5–5.3)
POTASSIUM SERPL-SCNC: 3.8 MMOL/L — SIGNIFICANT CHANGE UP (ref 3.5–5.3)
PROT SERPL-MCNC: 7 G/DL — SIGNIFICANT CHANGE UP (ref 6–8.3)
RBC # BLD: 4.85 M/UL — SIGNIFICANT CHANGE UP (ref 4.2–5.8)
RBC # FLD: 18 % — HIGH (ref 10.3–14.5)
SODIUM SERPL-SCNC: 138 MMOL/L — SIGNIFICANT CHANGE UP (ref 135–145)
WBC # BLD: 10.8 K/UL — HIGH (ref 3.8–10.5)
WBC # FLD AUTO: 10.8 K/UL — HIGH (ref 3.8–10.5)

## 2020-08-02 PROCEDURE — 99223 1ST HOSP IP/OBS HIGH 75: CPT

## 2020-08-02 PROCEDURE — 99223 1ST HOSP IP/OBS HIGH 75: CPT | Mod: GC

## 2020-08-02 RX ADMIN — Medication 12.5 MILLIGRAM(S): at 06:32

## 2020-08-02 RX ADMIN — LOSARTAN POTASSIUM 50 MILLIGRAM(S): 100 TABLET, FILM COATED ORAL at 06:32

## 2020-08-02 RX ADMIN — LIDOCAINE 1 PATCH: 4 CREAM TOPICAL at 07:39

## 2020-08-02 RX ADMIN — HEPARIN SODIUM 5000 UNIT(S): 5000 INJECTION INTRAVENOUS; SUBCUTANEOUS at 21:36

## 2020-08-02 RX ADMIN — Medication 1 APPLICATION(S): at 06:32

## 2020-08-02 RX ADMIN — PANTOPRAZOLE SODIUM 40 MILLIGRAM(S): 20 TABLET, DELAYED RELEASE ORAL at 07:53

## 2020-08-02 RX ADMIN — SODIUM CHLORIDE 1 GRAM(S): 9 INJECTION INTRAMUSCULAR; INTRAVENOUS; SUBCUTANEOUS at 12:48

## 2020-08-02 RX ADMIN — Medication 1 APPLICATION(S): at 17:35

## 2020-08-02 RX ADMIN — HEPARIN SODIUM 5000 UNIT(S): 5000 INJECTION INTRAVENOUS; SUBCUTANEOUS at 12:49

## 2020-08-02 RX ADMIN — Medication 300 MILLIGRAM(S): at 12:48

## 2020-08-02 RX ADMIN — Medication 1 TABLET(S): at 12:48

## 2020-08-02 RX ADMIN — Medication 1 TABLET(S): at 06:32

## 2020-08-02 RX ADMIN — SODIUM CHLORIDE 1 GRAM(S): 9 INJECTION INTRAMUSCULAR; INTRAVENOUS; SUBCUTANEOUS at 06:32

## 2020-08-02 RX ADMIN — Medication 500 MILLIGRAM(S): at 06:32

## 2020-08-02 RX ADMIN — LIDOCAINE 1 PATCH: 4 CREAM TOPICAL at 12:07

## 2020-08-02 RX ADMIN — Medication 500 MILLIGRAM(S): at 17:34

## 2020-08-02 RX ADMIN — Medication 12.5 MILLIGRAM(S): at 17:34

## 2020-08-02 RX ADMIN — HEPARIN SODIUM 5000 UNIT(S): 5000 INJECTION INTRAVENOUS; SUBCUTANEOUS at 06:33

## 2020-08-02 RX ADMIN — Medication 1 TABLET(S): at 17:34

## 2020-08-02 RX ADMIN — SODIUM CHLORIDE 1 GRAM(S): 9 INJECTION INTRAMUSCULAR; INTRAVENOUS; SUBCUTANEOUS at 21:59

## 2020-08-02 RX ADMIN — ATORVASTATIN CALCIUM 20 MILLIGRAM(S): 80 TABLET, FILM COATED ORAL at 21:36

## 2020-08-02 RX ADMIN — Medication 3 MILLIGRAM(S): at 21:36

## 2020-08-02 NOTE — PROGRESS NOTE ADULT - SUBJECTIVE AND OBJECTIVE BOX
See H&P  AM Labs  08-02    138  |  105  |  18  ----------------------------<  92  3.8   |  25  |  1.00    Ca    8.7      02 Aug 2020 07:50    TPro  7.0  /  Alb  2.8<L>  /  TBili  1.8<H>  /  DBili  x   /  AST  23  /  ALT  50<H>  /  AlkPhos  71  08-02    - Follow up CBC  - Stable to start rehab program.

## 2020-08-02 NOTE — CONSULT NOTE ADULT - ASSESSMENT
Assessment/Plan:  64yo male with hx of CAD s/p PCI 2017 on ASA/Plavix, BPH presented to [facility] for acute rehab from  [facility] s/p mechanical fall leading to Right orbital fracture, Right wrist fracture as well as SAH      #Debilitation  -Comprehensive Multidisciplinary Rehab Program:  -Continue comprehensive rehab program, 3 hours a day, 5 days a week.  -RUE NWB x 6 weeks from 7/19    #Diffuse SAH, small IVH and small parenchymal contusions  -CT Head results noted  -Continue to hold ASA/Plavix for now    #Orbital fracture  -S/p surgical repair by OMFS  -Patient must not touch lower eyelid. Do not pull eye lids.   -Erythromycin opth ointment 0.5% right eye BID  -Augmentin BID x 2 more days per OMFS  -Follow up with [facility] [phone]    #Radial styloid fracture and triquetrum avulsion fracture  -Sugar tong splint to RUE  -NWB for 6 weeks.     #Hyponatremia:  -Resolved  -Secondary to SIADH due to SAH  -Continue  NaCl tabs 1gm TID    #Hypertension:  -Chronic well controlled  -Losartan 50mg daily  -Metoprolol 12.5mg BID    #CAD  -s/p PCI 2017  -Atorvastatin  -ASA/Plavix on hold due to IVH, will need f/u with NSX and Cardiology     #Gout:  -Allopurinol 300mg daily    #Incidental Pulmonary Nodule:  -0.5cm pulm nodule of FELISHA can be followed with dedicated chest CT in 12 months    #DVT ppx:  -HSQ
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
## 2020-08-03 LAB
ANION GAP SERPL CALC-SCNC: 8 MMOL/L — SIGNIFICANT CHANGE UP (ref 5–17)
BUN SERPL-MCNC: 19 MG/DL — SIGNIFICANT CHANGE UP (ref 7–23)
CALCIUM SERPL-MCNC: 8.5 MG/DL — SIGNIFICANT CHANGE UP (ref 8.4–10.5)
CHLORIDE SERPL-SCNC: 104 MMOL/L — SIGNIFICANT CHANGE UP (ref 96–108)
CO2 SERPL-SCNC: 27 MMOL/L — SIGNIFICANT CHANGE UP (ref 22–31)
CREAT SERPL-MCNC: 0.99 MG/DL — SIGNIFICANT CHANGE UP (ref 0.5–1.3)
GLUCOSE SERPL-MCNC: 105 MG/DL — HIGH (ref 70–99)
HCT VFR BLD CALC: 32.7 % — LOW (ref 39–50)
HGB BLD-MCNC: 10 G/DL — LOW (ref 13–17)
MCHC RBC-ENTMCNC: 20.2 PG — LOW (ref 27–34)
MCHC RBC-ENTMCNC: 30.6 GM/DL — LOW (ref 32–36)
MCV RBC AUTO: 66.2 FL — LOW (ref 80–100)
NRBC # BLD: 0 /100 WBCS — SIGNIFICANT CHANGE UP (ref 0–0)
PLATELET # BLD AUTO: 312 K/UL — SIGNIFICANT CHANGE UP (ref 150–400)
POTASSIUM SERPL-MCNC: 3.8 MMOL/L — SIGNIFICANT CHANGE UP (ref 3.5–5.3)
POTASSIUM SERPL-SCNC: 3.8 MMOL/L — SIGNIFICANT CHANGE UP (ref 3.5–5.3)
RBC # BLD: 4.94 M/UL — SIGNIFICANT CHANGE UP (ref 4.2–5.8)
RBC # FLD: 17.9 % — HIGH (ref 10.3–14.5)
SODIUM SERPL-SCNC: 139 MMOL/L — SIGNIFICANT CHANGE UP (ref 135–145)
WBC # BLD: 10.13 K/UL — SIGNIFICANT CHANGE UP (ref 3.8–10.5)
WBC # FLD AUTO: 10.13 K/UL — SIGNIFICANT CHANGE UP (ref 3.8–10.5)

## 2020-08-03 PROCEDURE — 99232 SBSQ HOSP IP/OBS MODERATE 35: CPT

## 2020-08-03 PROCEDURE — 96116 NUBHVL XM PHYS/QHP 1ST HR: CPT

## 2020-08-03 RX ADMIN — Medication 1 TABLET(S): at 06:30

## 2020-08-03 RX ADMIN — Medication 1 TABLET(S): at 11:48

## 2020-08-03 RX ADMIN — SODIUM CHLORIDE 1 GRAM(S): 9 INJECTION INTRAMUSCULAR; INTRAVENOUS; SUBCUTANEOUS at 05:31

## 2020-08-03 RX ADMIN — Medication 1 APPLICATION(S): at 18:27

## 2020-08-03 RX ADMIN — Medication 500 MILLIGRAM(S): at 18:27

## 2020-08-03 RX ADMIN — HEPARIN SODIUM 5000 UNIT(S): 5000 INJECTION INTRAVENOUS; SUBCUTANEOUS at 05:30

## 2020-08-03 RX ADMIN — Medication 500 MILLIGRAM(S): at 05:31

## 2020-08-03 RX ADMIN — SODIUM CHLORIDE 1 GRAM(S): 9 INJECTION INTRAMUSCULAR; INTRAVENOUS; SUBCUTANEOUS at 21:21

## 2020-08-03 RX ADMIN — HEPARIN SODIUM 5000 UNIT(S): 5000 INJECTION INTRAVENOUS; SUBCUTANEOUS at 14:30

## 2020-08-03 RX ADMIN — Medication 3 MILLIGRAM(S): at 21:21

## 2020-08-03 RX ADMIN — HEPARIN SODIUM 5000 UNIT(S): 5000 INJECTION INTRAVENOUS; SUBCUTANEOUS at 21:21

## 2020-08-03 RX ADMIN — PANTOPRAZOLE SODIUM 40 MILLIGRAM(S): 20 TABLET, DELAYED RELEASE ORAL at 05:31

## 2020-08-03 RX ADMIN — LIDOCAINE 1 PATCH: 4 CREAM TOPICAL at 23:56

## 2020-08-03 RX ADMIN — LIDOCAINE 1 PATCH: 4 CREAM TOPICAL at 11:48

## 2020-08-03 RX ADMIN — LIDOCAINE 1 PATCH: 4 CREAM TOPICAL at 19:20

## 2020-08-03 RX ADMIN — ATORVASTATIN CALCIUM 20 MILLIGRAM(S): 80 TABLET, FILM COATED ORAL at 21:21

## 2020-08-03 RX ADMIN — Medication 12.5 MILLIGRAM(S): at 05:31

## 2020-08-03 RX ADMIN — SODIUM CHLORIDE 1 GRAM(S): 9 INJECTION INTRAMUSCULAR; INTRAVENOUS; SUBCUTANEOUS at 14:30

## 2020-08-03 RX ADMIN — Medication 1 APPLICATION(S): at 05:30

## 2020-08-03 RX ADMIN — LOSARTAN POTASSIUM 50 MILLIGRAM(S): 100 TABLET, FILM COATED ORAL at 05:31

## 2020-08-03 RX ADMIN — Medication 300 MILLIGRAM(S): at 11:48

## 2020-08-03 NOTE — DIETITIAN INITIAL EVALUATION ADULT. - OTHER INFO
65yr Old Male - Dx: SAH/Multitrama - Initial Assessment - Diet - Regular Diet w/ Thin Liquids (Recommend Change to DASH-TLC Diet), Denies Food Allergy/Intolerance, Tolerates Diet Well, No Chewing/Swallowing Complications (Per Patient), Consumed 100% of 1st Meal (as Per Documentation), No Pressure Ulcers (as Per Nursing Flow Sheets), No Edema Noted (as Per Nursing Flow Sheets), No Recent Nausea/Vomiting/Diarrhea/Constipation (as Per Patient)
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
## 2020-08-03 NOTE — PROGRESS NOTE ADULT - CONSTITUTIONAL COMMENTS
alert NAD grossly O x 3. follows simple commands, good- simple attention +right frontal and periorbital ecchymoses

## 2020-08-03 NOTE — CONSULT NOTE ADULT - ASSESSMENT
Assessment:    FIM scores: Social Interaction ; Problem Solving ; Memory .  Plan: Individual supportive psychotherapy to monitor cognition, affect/mood, and behavior. Cognitive remediation during speech therapy sessions. Participation in recreation/art therapy in order to have pleasure and mastery experiences and regain/reestablish a sense of routine. 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 He exhibited difficulties with short-term memory for verbal information, visuospatial skills (visuomotor integration) and aspects of executive functions (organizational skills, problem solving). His affect is constricted, and he reports mild anxiety in response to his current medical condition. FIM scores: Social Interaction 6; Problem Solving 5; Memory 5.  Plan: Pt is not currently in acute distress, but neuropsychologist remains available. Cognitive remediation during speech therapy sessions is strongly recommended. Participation in recreation/art therapy in order to have pleasure and mastery experiences and regain/reestablish a sense of routine.

## 2020-08-03 NOTE — DIETITIAN INITIAL EVALUATION ADULT. - ADD RECOMMEND
1) Monitor Weights, Intake, Tolerance, Skin & Labwork   2) Recommend Change to DASH-TLC Diet 3) Nutrition Education Provided on DASH-TLC Diet 4) Continue Nutrition Plan of Care

## 2020-08-03 NOTE — PROGRESS NOTE ADULT - SUBJECTIVE AND OBJECTIVE BOX
Osbaldo Payton M.D. Pager Number [phone]    Patient is a 65y old  Male who presents with a chief complaint of Functional deficits due to multitrauma (02 Aug 2020 09:43)      SUBJECTIVE / OVERNIGHT EVENTS:  Pt seen and examined at bedside. No acute events overnight.  Pt denies cp, palpitations, sob, abd pain, N/V, fever, chills.    ROS:  All other review of systems negative    Allergies    No Known Allergies    Intolerances        MEDICATIONS  (STANDING):  allopurinol 300 milliGRAM(s) Oral daily  ascorbic acid 500 milliGRAM(s) Oral two times a day  atorvastatin 20 milliGRAM(s) Oral at bedtime  erythromycin   Ointment 1 Application(s) Right EYE two times a day  heparin   Injectable 5000 Unit(s) SubCutaneous every 8 hours  lidocaine   Patch 1 Patch Transdermal daily  losartan 50 milliGRAM(s) Oral daily  metoprolol tartrate 12.5 milliGRAM(s) Oral two times a day  multivitamin 1 Tablet(s) Oral daily  pantoprazole    Tablet 40 milliGRAM(s) Oral before breakfast  polyethylene glycol 3350 17 Gram(s) Oral daily  sodium chloride 1 Gram(s) Oral three times a day    MEDICATIONS  (PRN):  acetaminophen   Tablet .. 650 milliGRAM(s) Oral every 6 hours PRN Mild Pain (1 - 3)  bisacodyl 10 milliGRAM(s) Oral at bedtime PRN Constipation  melatonin 3 milliGRAM(s) Oral at bedtime PRN Insomnia  ondansetron   Disintegrating Tablet 4 milliGRAM(s) Oral every 6 hours PRN Nausea and/or Vomiting  senna 2 Tablet(s) Oral at bedtime PRN Constipation      Vital Signs Last 24 Hrs  T(C): 36.6 (02 Aug 2020 21:42), Max: 36.6 (02 Aug 2020 21:42)  T(F): 97.9 (02 Aug 2020 21:42), Max: 97.9 (02 Aug 2020 21:42)  HR: 73 (03 Aug 2020 05:36) (70 - 73)  BP: 124/69 (03 Aug 2020 05:36) (124/69 - 128/71)  BP(mean): --  RR: 16 (02 Aug 2020 21:42) (16 - 16)  SpO2: 97% (02 Aug 2020 21:42) (97% - 97%)  CAPILLARY BLOOD GLUCOSE      POCT Blood Glucose.: 100 mg/dL (02 Aug 2020 12:05)    I&O's Summary    02 Aug 2020 07:01  -  03 Aug 2020 07:00  --------------------------------------------------------  IN: 720 mL / OUT: 775 mL / NET: -55 mL        PHYSICAL EXAM:  GENERAL: NAD, well-developed  EYES: Right periorbital edema, partial conjunctival hemorrhage, EOMI, PERRLA    CHEST/LUNG: Clear to auscultation bilaterally; No wheeze  HEART: Regular rate and rhythm; No murmurs, rubs, or gallops  ABDOMEN: Soft, Nontender, Nondistended; Bowel sounds present  EXTREMITIES:  2+ Peripheral Pulses, No clubbing, cyanosis, or edema. RUE in splint   PSYCH: AAOx3  NEUROLOGY: non-focal  SKIN: Multiple bruising on LE     LABS:                        10.0   10.13 )-----------( 312      ( 03 Aug 2020 06:00 )             32.7     08-03    139  |  104  |  19  ----------------------------<  105<H>  3.8   |  27  |  0.99    Ca    8.5      03 Aug 2020 06:00    TPro  7.0  /  Alb  2.8<L>  /  TBili  1.8<H>  /  DBili  x   /  AST  23  /  ALT  50<H>  /  AlkPhos  71  08-02              RADIOLOGY & ADDITIONAL TESTS:  Results Reviewed:   Imaging Personally Reviewed:  Electrocardiogram Personally Reviewed:    COORDINATION OF CARE:  Care Discussed with Consultants/Other Providers [Y/N]:  Prior or Outpatient Records Reviewed [Y/N]:

## 2020-08-03 NOTE — PROGRESS NOTE ADULT - COMMENTS
Patient denies H/A, dizziness, N/V, photophobia, eye pain, diplopia, blurred vision. Denies any numbness/tingling or pain with right hand. +RH dominant
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
## 2020-08-03 NOTE — PROGRESS NOTE ADULT - ASSESSMENT
Assessment/Plan:  66yo male with hx of CAD s/p PCI 2017 on ASA/Plavix, BPH presented to [facility] for acute rehab from  [facility] s/p mechanical fall leading to Right orbital fracture, Right wrist fracture as well as SAH      #Debilitation  -Comprehensive Multidisciplinary Rehab Program:  -Continue comprehensive rehab program, 3 hours a day, 5 days a week.  -RUE NWB x 6 weeks from 7/19    #Diffuse SAH, small IVH and small parenchymal contusions  -CT Head results noted  -Continue to hold ASA/Plavix for now    #Orbital fracture  -S/p surgical repair by OMFS  -Patient must not touch lower eyelid. Do not pull eye lids.   -Erythromycin opth ointment 0.5% right eye BID  -Completed course of Augmentin BID per OMFS  -Follow up with [facility] [phone]    #Radial styloid fracture and triquetrum avulsion fracture  -Sugar tong splint to RUE  -NWB for 6 weeks.     #Hyponatremia:  -Resolved  -Secondary to SIADH due to SAH  -Continue  NaCl tabs 1gm TID    #Sleep apnea  -Chronic  -At home CPAP QHS use settings 18  -Unable to start CPAP due to Facial trauma/fracture  -Continue supplemental O2 via NC QHS for comfort  -Monitor vitals and mental status    #Hypertension:  -Chronic well controlled  -Losartan 50mg daily  -Metoprolol 12.5mg BID    #CAD  -s/p PCI 2017  -Atorvastatin  -ASA/Plavix on hold due to IVH, will need f/u with NSX and Cardiology     #Gout:  -Allopurinol 300mg daily    #Incidental Pulmonary Nodule:  -0.5cm pulm nodule of FELISHA can be followed with dedicated chest CT in 12 months    #DVT ppx:  -HSQ

## 2020-08-03 NOTE — DIETITIAN INITIAL EVALUATION ADULT. - DIET TYPE
Regular Diet w/ Thin Liquids  Recommend Change Diet to DASH-TLC Diet (Nutrition Education Provided on DASH-TLC Diet)- Pt Aware & Inagreement  Patient Does Not Follow Diet @Home, Consumes 2Meals a Day & Doesn't Take Vitamin/Supplements @Home DASH/TLC (sodium and cholesterol restricted diet)/Nutrition Education Provided on DASH-TLC Diet

## 2020-08-04 LAB — SARS-COV-2 RNA SPEC QL NAA+PROBE: SIGNIFICANT CHANGE UP

## 2020-08-04 PROCEDURE — 99232 SBSQ HOSP IP/OBS MODERATE 35: CPT

## 2020-08-04 RX ADMIN — SODIUM CHLORIDE 1 GRAM(S): 9 INJECTION INTRAMUSCULAR; INTRAVENOUS; SUBCUTANEOUS at 13:30

## 2020-08-04 RX ADMIN — HEPARIN SODIUM 5000 UNIT(S): 5000 INJECTION INTRAVENOUS; SUBCUTANEOUS at 23:05

## 2020-08-04 RX ADMIN — LIDOCAINE 1 PATCH: 4 CREAM TOPICAL at 12:06

## 2020-08-04 RX ADMIN — HEPARIN SODIUM 5000 UNIT(S): 5000 INJECTION INTRAVENOUS; SUBCUTANEOUS at 05:28

## 2020-08-04 RX ADMIN — SODIUM CHLORIDE 1 GRAM(S): 9 INJECTION INTRAMUSCULAR; INTRAVENOUS; SUBCUTANEOUS at 23:05

## 2020-08-04 RX ADMIN — SODIUM CHLORIDE 1 GRAM(S): 9 INJECTION INTRAMUSCULAR; INTRAVENOUS; SUBCUTANEOUS at 05:28

## 2020-08-04 RX ADMIN — Medication 500 MILLIGRAM(S): at 17:00

## 2020-08-04 RX ADMIN — Medication 300 MILLIGRAM(S): at 12:05

## 2020-08-04 RX ADMIN — Medication 500 MILLIGRAM(S): at 05:28

## 2020-08-04 RX ADMIN — Medication 12.5 MILLIGRAM(S): at 05:28

## 2020-08-04 RX ADMIN — LOSARTAN POTASSIUM 50 MILLIGRAM(S): 100 TABLET, FILM COATED ORAL at 05:28

## 2020-08-04 RX ADMIN — Medication 12.5 MILLIGRAM(S): at 17:00

## 2020-08-04 RX ADMIN — Medication 1 APPLICATION(S): at 05:28

## 2020-08-04 RX ADMIN — POLYETHYLENE GLYCOL 3350 17 GRAM(S): 17 POWDER, FOR SOLUTION ORAL at 12:06

## 2020-08-04 RX ADMIN — LIDOCAINE 1 PATCH: 4 CREAM TOPICAL at 23:05

## 2020-08-04 RX ADMIN — ATORVASTATIN CALCIUM 20 MILLIGRAM(S): 80 TABLET, FILM COATED ORAL at 23:05

## 2020-08-04 RX ADMIN — Medication 1 APPLICATION(S): at 17:00

## 2020-08-04 RX ADMIN — PANTOPRAZOLE SODIUM 40 MILLIGRAM(S): 20 TABLET, DELAYED RELEASE ORAL at 05:28

## 2020-08-04 RX ADMIN — HEPARIN SODIUM 5000 UNIT(S): 5000 INJECTION INTRAVENOUS; SUBCUTANEOUS at 13:30

## 2020-08-04 RX ADMIN — Medication 1 TABLET(S): at 13:30

## 2020-08-04 NOTE — PROGRESS NOTE ADULT - SUBJECTIVE AND OBJECTIVE BOX
Osbaldo Payton M.D. Pager Number [phone]    Patient is a 65y old  Male who presents with a chief complaint of Functional deficits due to multitrauma (04 Aug 2020 12:25)      SUBJECTIVE / OVERNIGHT EVENTS:  Pt seen and examined at bedside. No acute events overnight.  Pt denies cp, palpitations, sob, abd pain, N/V, fever, chills.    ROS:  All other review of systems negative    Allergies    No Known Allergies    Intolerances        MEDICATIONS  (STANDING):  allopurinol 300 milliGRAM(s) Oral daily  ascorbic acid 500 milliGRAM(s) Oral two times a day  atorvastatin 20 milliGRAM(s) Oral at bedtime  erythromycin   Ointment 1 Application(s) Right EYE two times a day  heparin   Injectable 5000 Unit(s) SubCutaneous every 8 hours  lidocaine   Patch 1 Patch Transdermal daily  losartan 50 milliGRAM(s) Oral daily  metoprolol tartrate 12.5 milliGRAM(s) Oral two times a day  multivitamin 1 Tablet(s) Oral daily  pantoprazole    Tablet 40 milliGRAM(s) Oral before breakfast  polyethylene glycol 3350 17 Gram(s) Oral daily  sodium chloride 1 Gram(s) Oral three times a day    MEDICATIONS  (PRN):  acetaminophen   Tablet .. 650 milliGRAM(s) Oral every 6 hours PRN Mild Pain (1 - 3)  bisacodyl 10 milliGRAM(s) Oral at bedtime PRN Constipation  melatonin 3 milliGRAM(s) Oral at bedtime PRN Insomnia  ondansetron   Disintegrating Tablet 4 milliGRAM(s) Oral every 6 hours PRN Nausea and/or Vomiting  senna 2 Tablet(s) Oral at bedtime PRN Constipation      Vital Signs Last 24 Hrs  T(C): 36.6 (03 Aug 2020 21:19), Max: 36.6 (03 Aug 2020 21:19)  T(F): 97.8 (03 Aug 2020 21:19), Max: 97.8 (03 Aug 2020 21:19)  HR: 74 (04 Aug 2020 05:25) (73 - 74)  BP: 133/75 (04 Aug 2020 05:25) (97/60 - 133/75)  BP(mean): --  RR: 15 (04 Aug 2020 05:25) (15 - 16)  SpO2: 96% (04 Aug 2020 05:25) (96% - 98%)  CAPILLARY BLOOD GLUCOSE        I&O's Summary    03 Aug 2020 07:01  -  04 Aug 2020 07:00  --------------------------------------------------------  IN: 480 mL / OUT: 250 mL / NET: 230 mL    04 Aug 2020 07:01  -  04 Aug 2020 13:43  --------------------------------------------------------  IN: 480 mL / OUT: 0 mL / NET: 480 mL        PHYSICAL EXAM:  GENERAL: NAD, well-developed  EYES: Right periorbital edema, partial conjunctival hemorrhage, EOMI, PERRLA    CHEST/LUNG: Clear to auscultation bilaterally; No wheeze  HEART: Regular rate and rhythm; No murmurs, rubs, or gallops  ABDOMEN: Soft, Nontender, Nondistended; Bowel sounds present  EXTREMITIES:  2+ Peripheral Pulses, No clubbing, cyanosis, or edema. RUE in splint   PSYCH: AAOx3  NEUROLOGY: non-focal  SKIN: Multiple bruising on LE    LABS:                        10.0   10.13 )-----------( 312      ( 03 Aug 2020 06:00 )             32.7     08-03    139  |  104  |  19  ----------------------------<  105<H>  3.8   |  27  |  0.99    Ca    8.5      03 Aug 2020 06:00                RADIOLOGY & ADDITIONAL TESTS:  Results Reviewed:   Imaging Personally Reviewed:  Electrocardiogram Personally Reviewed:    COORDINATION OF CARE:  Care Discussed with Consultants/Other Providers [Y/N]:  Prior or Outpatient Records Reviewed [Y/N]:
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
## 2020-08-04 NOTE — PROGRESS NOTE ADULT - ASSESSMENT
Assessment/Plan:  66yo male with hx of CAD s/p PCI 2017 on ASA/Plavix, BPH presented to [facility] for acute rehab from  [facility] s/p mechanical fall leading to Right orbital fracture, Right wrist fracture as well as SAH    #Debilitation  -Comprehensive Multidisciplinary Rehab Program:  -Continue comprehensive rehab program, 3 hours a day, 5 days a week.  -RUE NWB x 6 weeks from 7/19    #Diffuse SAH, small IVH and small parenchymal contusions  -CT Head results noted  -Continue to hold ASA/Plavix for now    #Orbital fracture  -S/p surgical repair by OMFS  -Patient must not touch lower eyelid. Do not pull eye lids.   -Erythromycin opth ointment 0.5% right eye BID  -Completed course of Augmentin BID per OMFS  -Follow up with [facility] [phone]    #Radial styloid fracture and triquetrum avulsion fracture  -Sugar tong splint to RUE  -NWB for 6 weeks.     #Hyponatremia:  -Resolved  -Secondary to SIADH due to SAH  -Continue  NaCl tabs 1gm TID    #Sleep apnea  -Chronic  -At home CPAP QHS use settings 18  -Unable to start CPAP due to Facial trauma/fracture  -Continue supplemental O2 via NC QHS for comfort  -Monitor vitals and mental status    #Hypertension:  -Chronic well controlled  -Losartan 50mg daily  -Metoprolol 12.5mg BID    #CAD  -s/p PCI 2017  -Atorvastatin  -ASA/Plavix on hold due to IVH, will need f/u with NSX and Cardiology     #Gout:  -Allopurinol 300mg daily    #Incidental Pulmonary Nodule:  -0.5cm pulm nodule of FELISHA can be followed with dedicated chest CT in 12 months    #DVT ppx:  -HSQ
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
## 2020-08-05 ENCOUNTER — TRANSCRIPTION ENCOUNTER (OUTPATIENT)
Age: 65
End: 2020-08-05

## 2020-08-05 PROCEDURE — 99232 SBSQ HOSP IP/OBS MODERATE 35: CPT

## 2020-08-05 PROCEDURE — 99233 SBSQ HOSP IP/OBS HIGH 50: CPT

## 2020-08-05 RX ORDER — LACTULOSE 10 G/15ML
10 SOLUTION ORAL DAILY
Refills: 0 | Status: DISCONTINUED | OUTPATIENT
Start: 2020-08-05 | End: 2020-08-08

## 2020-08-05 RX ADMIN — Medication 1 APPLICATION(S): at 17:21

## 2020-08-05 RX ADMIN — SODIUM CHLORIDE 1 GRAM(S): 9 INJECTION INTRAMUSCULAR; INTRAVENOUS; SUBCUTANEOUS at 22:46

## 2020-08-05 RX ADMIN — PANTOPRAZOLE SODIUM 40 MILLIGRAM(S): 20 TABLET, DELAYED RELEASE ORAL at 06:46

## 2020-08-05 RX ADMIN — Medication 1 APPLICATION(S): at 06:45

## 2020-08-05 RX ADMIN — HEPARIN SODIUM 5000 UNIT(S): 5000 INJECTION INTRAVENOUS; SUBCUTANEOUS at 22:46

## 2020-08-05 RX ADMIN — Medication 12.5 MILLIGRAM(S): at 17:21

## 2020-08-05 RX ADMIN — POLYETHYLENE GLYCOL 3350 17 GRAM(S): 17 POWDER, FOR SOLUTION ORAL at 12:02

## 2020-08-05 RX ADMIN — Medication 1 TABLET(S): at 11:59

## 2020-08-05 RX ADMIN — Medication 500 MILLIGRAM(S): at 06:45

## 2020-08-05 RX ADMIN — HEPARIN SODIUM 5000 UNIT(S): 5000 INJECTION INTRAVENOUS; SUBCUTANEOUS at 13:47

## 2020-08-05 RX ADMIN — SODIUM CHLORIDE 1 GRAM(S): 9 INJECTION INTRAMUSCULAR; INTRAVENOUS; SUBCUTANEOUS at 13:47

## 2020-08-05 RX ADMIN — LIDOCAINE 1 PATCH: 4 CREAM TOPICAL at 19:13

## 2020-08-05 RX ADMIN — ATORVASTATIN CALCIUM 20 MILLIGRAM(S): 80 TABLET, FILM COATED ORAL at 22:46

## 2020-08-05 RX ADMIN — SODIUM CHLORIDE 1 GRAM(S): 9 INJECTION INTRAMUSCULAR; INTRAVENOUS; SUBCUTANEOUS at 06:46

## 2020-08-05 RX ADMIN — LIDOCAINE 1 PATCH: 4 CREAM TOPICAL at 13:24

## 2020-08-05 RX ADMIN — HEPARIN SODIUM 5000 UNIT(S): 5000 INJECTION INTRAVENOUS; SUBCUTANEOUS at 06:45

## 2020-08-05 RX ADMIN — Medication 500 MILLIGRAM(S): at 17:21

## 2020-08-05 RX ADMIN — Medication 300 MILLIGRAM(S): at 13:24

## 2020-08-05 RX ADMIN — Medication 12.5 MILLIGRAM(S): at 06:46

## 2020-08-05 RX ADMIN — LOSARTAN POTASSIUM 50 MILLIGRAM(S): 100 TABLET, FILM COATED ORAL at 06:46

## 2020-08-05 NOTE — DISCHARGE NOTE PROVIDER - PROVIDER TOKENS
PROVIDER:[TOKEN:[[id number]]],PROVIDER:[TOKEN:[[id number]]] PROVIDER:[TOKEN:[[id number]]],PROVIDER:[TOKEN:[[id number]]],FREE:[LAST:[Orthopedic Surgery],PHONE:[phone]],FAX:[(   )    -],ADDRESS:[[facility] [place]  [address]  [place] [address]]],FREE:[LAST:[Ophthalmology],PHONE:[phone]],FAX:[(   )    -],ADDRESS:[[facility]  [address]  [place] [address]]],FREE:[LAST:[Cardiology],PHONE:[phone]],FAX:[(   )    -],ADDRESS:[[facility] [place]  [address]  [place] [address]]],FREE:[LAST:[Neurosurgery],PHONE:[phone]],FAX:[(   )    -],ADDRESS:[[facility] [place]  [address]  [place] [address]]]

## 2020-08-05 NOTE — DISCHARGE NOTE PROVIDER - CARE PROVIDER_API CALL
Bobbi Oliveira  Physical Medicine and Rehabilitation  [address] [place] [address]  Phone: [phone]  Fax: [phone]  Follow Up Time:     Craig Blunt; MD)  Dental Medicine  [address] [place] [address]  Phone: [phone]  Fax: [phone]  Follow Up Time: Bobbi Oliveria  Physical Medicine and Rehabilitation  [address] [place] [address]  Phone: [phone]  Fax: [phone]  Follow Up Time:     Craig Blunt; MD)  Dental Medicine  [address]  [place] [address]  Phone: [phone]  Fax: [phone]  Follow Up Time:     Orthopedic Surgery,   [facility] [place] [facility] [place]  [address]  [place] [address]  Phone: [phone]  Fax: (   )    -  Follow Up Time:     Ophthalmology,   [facility]  [address]  [place] [address]  Phone: [phone]  Fax: (   )    -  Follow Up Time:     Cardiology,   [facility] [place]  [address]  [place] [address]  Phone: [phone]  Fax: (   )    -  Follow Up Time:     Neurosurgery,   [facility] [place]  [address]  [place] [address]  Phone: [phone]  Fax: (   )    -  Follow Up Time:

## 2020-08-05 NOTE — DISCHARGE NOTE PROVIDER - NSDCMRMEDTOKEN_GEN_ALL_CORE_FT
allopurinol 300 mg oral tablet: 1 tab(s) orally once a day  aspirin 81 mg oral tablet: 1 tab(s) orally once a day  atorvastatin 20 mg oral tablet: 1 tab(s) orally once a day  Plavix 75 mg oral tablet: 1 tab(s) orally once a day  Ranexa 500 mg oral tablet, extended release: 1 tab(s) orally 2 times a day  Toprol-XL 25 mg oral tablet, extended release: 1 tab(s) orally once a day allopurinol 300 mg oral tablet: 1 tab(s) orally once a day  ascorbic acid 500 mg oral tablet: 1 tab(s) orally 2 times a day  atorvastatin 20 mg oral tablet: 1 tab(s) orally once a day  erythromycin 0.5% ophthalmic ointment: 1 application in the right eye 2 times a day   losartan 50 mg oral tablet: 1 tab(s) orally once a day  Multiple Vitamins oral tablet: 1 tab(s) orally once a day  sodium chloride 1 g oral tablet: 1 tab(s) orally once a day  Toprol-XL 25 mg oral tablet, extended release: 1 tab(s) orally once a day

## 2020-08-05 NOTE — PROGRESS NOTE ADULT - SUBJECTIVE AND OBJECTIVE BOX
Osbaldo Payton M.D. Pager Number [phone]    Patient is a 65y old  Male who presents with a chief complaint of Functional deficits due to multitrauma (04 Aug 2020 13:42)      SUBJECTIVE / OVERNIGHT EVENTS:  Pt seen and examined at bedside. No acute events overnight.  Pt denies cp, palpitations, sob, abd pain, N/V, fever, chills.    ROS:  All other review of systems negative    Allergies    No Known Allergies    Intolerances        MEDICATIONS  (STANDING):  allopurinol 300 milliGRAM(s) Oral daily  ascorbic acid 500 milliGRAM(s) Oral two times a day  atorvastatin 20 milliGRAM(s) Oral at bedtime  erythromycin   Ointment 1 Application(s) Right EYE two times a day  heparin   Injectable 5000 Unit(s) SubCutaneous every 8 hours  lidocaine   Patch 1 Patch Transdermal daily  losartan 50 milliGRAM(s) Oral daily  metoprolol tartrate 12.5 milliGRAM(s) Oral two times a day  multivitamin 1 Tablet(s) Oral daily  pantoprazole    Tablet 40 milliGRAM(s) Oral before breakfast  polyethylene glycol 3350 17 Gram(s) Oral daily  sodium chloride 1 Gram(s) Oral three times a day    MEDICATIONS  (PRN):  acetaminophen   Tablet .. 650 milliGRAM(s) Oral every 6 hours PRN Mild Pain (1 - 3)  bisacodyl 10 milliGRAM(s) Oral at bedtime PRN Constipation  melatonin 3 milliGRAM(s) Oral at bedtime PRN Insomnia  ondansetron   Disintegrating Tablet 4 milliGRAM(s) Oral every 6 hours PRN Nausea and/or Vomiting  senna 2 Tablet(s) Oral at bedtime PRN Constipation      Vital Signs Last 24 Hrs  T(C): 36.4 (05 Aug 2020 11:06), Max: 36.4 (04 Aug 2020 23:17)  T(F): 97.5 (05 Aug 2020 11:06), Max: 97.6 (04 Aug 2020 23:17)  HR: 76 (05 Aug 2020 11:06) (70 - 76)  BP: 116/75 (05 Aug 2020 11:06) (116/75 - 122/70)  BP(mean): --  RR: 16 (05 Aug 2020 11:06) (15 - 16)  SpO2: 97% (05 Aug 2020 11:06) (97% - 98%)  CAPILLARY BLOOD GLUCOSE        I&O's Summary    04 Aug 2020 07:01  -  05 Aug 2020 07:00  --------------------------------------------------------  IN: 480 mL / OUT: 0 mL / NET: 480 mL        PHYSICAL EXAM:  GENERAL: NAD, well-developed  EYES: Right periorbital edema, partial conjunctival hemorrhage, EOMI, PERRLA    CHEST/LUNG: Clear to auscultation bilaterally; No wheeze  HEART: Regular rate and rhythm; No murmurs, rubs, or gallops  ABDOMEN: Soft, Nontender, Nondistended; Bowel sounds present  EXTREMITIES:  2+ Peripheral Pulses, No clubbing, cyanosis, or edema. RUE in splint   PSYCH: AAOx3  NEUROLOGY: non-focal  SKIN: Multiple bruising on LE    LABS:                    RADIOLOGY & ADDITIONAL TESTS:  Results Reviewed:   Imaging Personally Reviewed:  Electrocardiogram Personally Reviewed:    COORDINATION OF CARE:  Care Discussed with Consultants/Other Providers [Y/N]:  Prior or Outpatient Records Reviewed [Y/N]:
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
## 2020-08-05 NOTE — DISCHARGE NOTE PROVIDER - NSDCFUADDAPPT_GEN_ALL_CORE_FT
1. Please follow up with Dr. Oliveira in approximately 4 weeks for rehab needs.  2. Please follow up with PMD within 1 week of discharge.  3. Please follow up with Dr. Blunt, the oral & maxillofacial surgeon after discharge.  4. Please follow up with the orthopaedic surgeon for management of your right forearm fracture. 1. Please follow up with Dr. Oliveira in approximately 4 weeks for rehab needs.  2. Please follow up with PMD within 1 week of discharge.  3. Please follow up with Dr. Blunt, the oral & maxillofacial surgeon after discharge.  4. Please follow up with the orthopaedic surgeon for management of your right forearm fracture. If you do not have a surgeon for follow up, we have provided you with the contact information of the [facility] orthopedic clinic  5. Please follow up with an ophthalmologist after being discharged. We have provided you with the contact information for the [facility] ophthalmology clinic.  6. Please follow up with the neurosurgeon and cardiologist regarding aspirin and plavix management. Due to risk of bleeding in your brain after your fall, both medications have been on hold. Please do not restart either medication unless cleared to do so by the cardiologist and neurosurgeon. If you do not have a cardiologist or neurosurgeon, we have provided you with the contact information for [facility] providers from within these specialties.

## 2020-08-05 NOTE — DISCHARGE NOTE PROVIDER - CARE PROVIDERS DIRECT ADDRESSES
,DirectAddress_Unknown,DirectAddress_Unknown ,DirectAddress_Unknown,DirectAddress_Unknown,DirectAddress_Unknown,DirectAddress_Unknown,DirectAddress_Unknown,DirectAddress_Unknown
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
## 2020-08-05 NOTE — DISCHARGE NOTE PROVIDER - NSDCCPCAREPLAN_GEN_ALL_CORE_FT
PRINCIPAL DISCHARGE DIAGNOSIS  Diagnosis: Debility  Assessment and Plan of Treatment: You were admitted to [facility] for rehabilitation due to functional impairments following your fall and surgery. You tolerated a regimen of physical therapy, occupational therapy, and speech & language therapy well and are now ready for discharge home. Please follow up with Dr. Oliveira in approximately 4 weeks for further rehab needs.      SECONDARY DISCHARGE DIAGNOSES  Diagnosis: Radius fracture  Assessment and Plan of Treatment: You fracturd a bone in your forearm, called the radius. No surgical intervention was indicated and instead, your forearm was allowed to heal on its own. Please continue to wear the cast and please remember not to bear weight on your right arm to ensure proper healing. Please call the orthopaedic surgeon's office for a follow up appointment.    Diagnosis: Fracture of orbital floor  Assessment and Plan of Treatment: You had surgery with Dr. Blunt, the oral & maxillofacial surgeon to fix your right orbital fracture. Please follow up with Dr. Blunt after being discharged for further care. In the meantime, please continue to apply Eythromycin ointment to your eye, as directed. If you begin to experience worsening vision or new symptoms, please do not hesitate to follow up with the ophthalmologist for further management. PRINCIPAL DISCHARGE DIAGNOSIS  Diagnosis: Debility  Assessment and Plan of Treatment: You were admitted to [facility] for rehabilitation due to functional impairments following your fall and surgery. You tolerated a regimen of physical therapy, occupational therapy, and speech & language therapy well and are now ready for discharge home. Please follow up with Dr. Oliveira in approximately 4 weeks for further rehab needs.      SECONDARY DISCHARGE DIAGNOSES  Diagnosis: Radius fracture  Assessment and Plan of Treatment: You fracturd a bone in your forearm, called the radius. No surgical intervention was indicated and instead, your forearm was allowed to heal on its own. Please continue to wear the cast and please remember not to bear weight on your right arm to ensure proper healing. Please call the orthopaedic surgeon's office for a follow up appointment.    Diagnosis: Fracture of orbital floor  Assessment and Plan of Treatment: You had surgery with Dr. Blunt, the oral & maxillofacial surgeon to fix your right orbital fracture. Please follow up with Dr. Blunt after being discharged for further care. In the meantime, please continue to apply Eythromycin ointment to your eye, as directed. If you begin to experience worsening vision or new symptoms, please do not hesitate to follow up with the ophthalmologist for further management. PRINCIPAL DISCHARGE DIAGNOSIS  Diagnosis: Debility  Assessment and Plan of Treatment: You were admitted to [facility] [place] for rehabilitation due to functional impairments following your fall and surgery. You tolerated a regimen of physical therapy, occupational therapy, and speech & language therapy well and are now ready for discharge home. Please follow up with Dr. Oliveira in approximately 4 weeks for further rehab needs.      SECONDARY DISCHARGE DIAGNOSES  Diagnosis: SAH (subarachnoid hemorrhage)  Assessment and Plan of Treatment: After a fall, you were found to have a bleed in your brain, called a subarachnoid hemorrhage. Your aspirin and plavix were held due to risk of bleeding. After being discharged, please follow up with your cardiologist and neurosurgeon for further management.    Diagnosis: Hyponatremia  Assessment and Plan of Treatment: Your sodium was low during your stay at [facility]. You were started on a salt tablet while there. Please continue to take this medication, as prescribed and have close follow up with your PMD to determine duration of treatment.    Diagnosis: Radius fracture  Assessment and Plan of Treatment: You fracturd a bone in your forearm, called the radius. No surgical intervention was indicated and instead, your forearm was allowed to heal on its own. Please continue to wear the cast and please remember not to bear weight on your right arm to ensure proper healing. Please call the orthopaedic surgeon's office for a follow up appointment.    Diagnosis: Fracture of orbital floor  Assessment and Plan of Treatment: You had surgery with Dr. Blunt, the oral & maxillofacial surgeon to fix your right orbital fracture. Please follow up with Dr. Blunt after being discharged for further care. In the meantime, please continue to apply Eythromycin ointment to your eye, as directed. If you begin to experience worsening vision or new symptoms, please do not hesitate to follow up with the ophthalmologist for further management.

## 2020-08-05 NOTE — PROGRESS NOTE ADULT - ASSESSMENT
Assessment/Plan:  66yo male with hx of CAD s/p PCI 2017 on ASA/Plavix, BPH presented to [facility] for acute rehab from  [facility] s/p mechanical fall leading to Right orbital fracture, Right wrist fracture as well as SAH    #Debilitation  -Comprehensive Multidisciplinary Rehab Program:  -Continue comprehensive rehab program, 3 hours a day, 5 days a week.  -RUE NWB x 6 weeks from 7/19    #Diffuse SAH, small IVH and small parenchymal contusions  -CT Head results noted  -Continue to hold ASA/Plavix for now    #Orbital fracture  -S/p surgical repair by OMFS  -Patient must not touch lower eyelid. Do not pull eye lids.   -Erythromycin opth ointment 0.5% right eye BID  -Completed course of Augmentin BID per OMFS  -Follow up with Craig Bl[facility] [phone]    #Radial styloid fracture and triquetrum avulsion fracture  -Sugar tong splint to RUE  -NWB for 6 weeks.     #Hyponatremia:  -Resolved  -Secondary to SIADH due to SAH  -Continue  NaCl tabs 1gm TID    #Sleep apnea  -Chronic  -At home CPAP QHS use settings 18  -Unable to start CPAP due to Facial trauma/fracture  -Continue supplemental O2 via NC QHS for comfort  -Monitor vitals and mental status    #Hypertension:  -Chronic well controlled  -Losartan 50mg daily  -Metoprolol 12.5mg BID    #CAD  -s/p PCI 2017  -Atorvastatin  -ASA/Plavix on hold due to IVH, will need f/u with NSX and Cardiology     #Gout:  -Allopurinol 300mg daily    #Incidental Pulmonary Nodule:  -0.5cm pulm nodule of FELISHA can be followed with dedicated chest CT in 12 months    #DVT ppx:  -HSQ

## 2020-08-06 ENCOUNTER — TRANSCRIPTION ENCOUNTER (OUTPATIENT)
Age: 65
End: 2020-08-06

## 2020-08-06 LAB
ANION GAP SERPL CALC-SCNC: 7 MMOL/L — SIGNIFICANT CHANGE UP (ref 5–17)
BUN SERPL-MCNC: 19 MG/DL — SIGNIFICANT CHANGE UP (ref 7–23)
CALCIUM SERPL-MCNC: 8.7 MG/DL — SIGNIFICANT CHANGE UP (ref 8.4–10.5)
CHLORIDE SERPL-SCNC: 105 MMOL/L — SIGNIFICANT CHANGE UP (ref 96–108)
CO2 SERPL-SCNC: 28 MMOL/L — SIGNIFICANT CHANGE UP (ref 22–31)
CREAT SERPL-MCNC: 0.98 MG/DL — SIGNIFICANT CHANGE UP (ref 0.5–1.3)
GLUCOSE SERPL-MCNC: 96 MG/DL — SIGNIFICANT CHANGE UP (ref 70–99)
HCT VFR BLD CALC: 32.9 % — LOW (ref 39–50)
HGB BLD-MCNC: 10.1 G/DL — LOW (ref 13–17)
MCHC RBC-ENTMCNC: 20.6 PG — LOW (ref 27–34)
MCHC RBC-ENTMCNC: 30.7 GM/DL — LOW (ref 32–36)
MCV RBC AUTO: 67 FL — LOW (ref 80–100)
NRBC # BLD: 0 /100 WBCS — SIGNIFICANT CHANGE UP (ref 0–0)
PLATELET # BLD AUTO: 327 K/UL — SIGNIFICANT CHANGE UP (ref 150–400)
POTASSIUM SERPL-MCNC: 4.4 MMOL/L — SIGNIFICANT CHANGE UP (ref 3.5–5.3)
POTASSIUM SERPL-SCNC: 4.4 MMOL/L — SIGNIFICANT CHANGE UP (ref 3.5–5.3)
RBC # BLD: 4.91 M/UL — SIGNIFICANT CHANGE UP (ref 4.2–5.8)
RBC # FLD: 18.6 % — HIGH (ref 10.3–14.5)
SODIUM SERPL-SCNC: 140 MMOL/L — SIGNIFICANT CHANGE UP (ref 135–145)
WBC # BLD: 8.51 K/UL — SIGNIFICANT CHANGE UP (ref 3.8–10.5)
WBC # FLD AUTO: 8.51 K/UL — SIGNIFICANT CHANGE UP (ref 3.8–10.5)

## 2020-08-06 PROCEDURE — 99232 SBSQ HOSP IP/OBS MODERATE 35: CPT

## 2020-08-06 RX ORDER — SODIUM CHLORIDE 9 MG/ML
1 INJECTION INTRAMUSCULAR; INTRAVENOUS; SUBCUTANEOUS
Refills: 0 | Status: DISCONTINUED | OUTPATIENT
Start: 2020-08-06 | End: 2020-08-07

## 2020-08-06 RX ADMIN — Medication 500 MILLIGRAM(S): at 05:22

## 2020-08-06 RX ADMIN — PANTOPRAZOLE SODIUM 40 MILLIGRAM(S): 20 TABLET, DELAYED RELEASE ORAL at 05:23

## 2020-08-06 RX ADMIN — HEPARIN SODIUM 5000 UNIT(S): 5000 INJECTION INTRAVENOUS; SUBCUTANEOUS at 21:41

## 2020-08-06 RX ADMIN — Medication 300 MILLIGRAM(S): at 12:30

## 2020-08-06 RX ADMIN — Medication 1 APPLICATION(S): at 05:22

## 2020-08-06 RX ADMIN — HEPARIN SODIUM 5000 UNIT(S): 5000 INJECTION INTRAVENOUS; SUBCUTANEOUS at 14:22

## 2020-08-06 RX ADMIN — LIDOCAINE 1 PATCH: 4 CREAM TOPICAL at 19:43

## 2020-08-06 RX ADMIN — SODIUM CHLORIDE 1 GRAM(S): 9 INJECTION INTRAMUSCULAR; INTRAVENOUS; SUBCUTANEOUS at 05:22

## 2020-08-06 RX ADMIN — Medication 500 MILLIGRAM(S): at 17:30

## 2020-08-06 RX ADMIN — ATORVASTATIN CALCIUM 20 MILLIGRAM(S): 80 TABLET, FILM COATED ORAL at 21:41

## 2020-08-06 RX ADMIN — Medication 12.5 MILLIGRAM(S): at 17:31

## 2020-08-06 RX ADMIN — SODIUM CHLORIDE 1 GRAM(S): 9 INJECTION INTRAMUSCULAR; INTRAVENOUS; SUBCUTANEOUS at 17:29

## 2020-08-06 RX ADMIN — LOSARTAN POTASSIUM 50 MILLIGRAM(S): 100 TABLET, FILM COATED ORAL at 05:22

## 2020-08-06 RX ADMIN — LIDOCAINE 1 PATCH: 4 CREAM TOPICAL at 01:11

## 2020-08-06 RX ADMIN — LIDOCAINE 1 PATCH: 4 CREAM TOPICAL at 12:29

## 2020-08-06 RX ADMIN — Medication 12.5 MILLIGRAM(S): at 05:23

## 2020-08-06 RX ADMIN — Medication 1 TABLET(S): at 12:30

## 2020-08-06 RX ADMIN — Medication 1 APPLICATION(S): at 17:30

## 2020-08-06 RX ADMIN — HEPARIN SODIUM 5000 UNIT(S): 5000 INJECTION INTRAVENOUS; SUBCUTANEOUS at 05:22

## 2020-08-06 NOTE — DISCHARGE NOTE NURSING/CASE MANAGEMENT/SOCIAL WORK - PATIENT PORTAL LINK FT
You can access the FollowMyHealth Patient Portal offered by [facility] by registering at the following website: [email or web address]. By joining [facility]’s FollowMyHealth portal, you will also be able to view your health information using other applications (apps) compatible with our system.

## 2020-08-06 NOTE — DISCHARGE NOTE NURSING/CASE MANAGEMENT/SOCIAL WORK - NSDCPEPTSTRK_GEN_ALL_CORE
Need for follow up after discharge/Risk factors for stroke/Stroke warning signs and symptoms/Signs and symptoms of stroke/Prescribed medications/Stroke support groups for patients, families, and friends/Call 911 for stroke/Stroke education booklet
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
## 2020-08-06 NOTE — DISCHARGE NOTE NURSING/CASE MANAGEMENT/SOCIAL WORK - NSDCFUADDAPPT_GEN_ALL_CORE_FT
1. Please follow up with Dr. Oliveira in approximately 4 weeks for rehab needs.  2. Please follow up with PMD within 1 week of discharge.  3. Please follow up with Dr. Blunt, the oral & maxillofacial surgeon after discharge.  4. Please follow up with the orthopaedic surgeon for management of your right forearm fracture. If you do not have a surgeon for follow up, we have provided you with the contact information of the [facility] orthopedic clinic  5. Please follow up with an ophthalmologist after being discharged. We have provided you with the contact information for the [facility] ophthalmology clinic.  6. Please follow up with the neurosurgeon and cardiologist regarding aspirin and plavix management. Due to risk of bleeding in your brain after your fall, both medications have been on hold. Please do not restart either medication unless cleared to do so by the cardiologist and neurosurgeon. If you do not have a cardiologist or neurosurgeon, we have provided you with the contact information for [facility] providers from within these specialties.

## 2020-08-06 NOTE — PROGRESS NOTE ADULT - SUBJECTIVE AND OBJECTIVE BOX
Osbaldo Payton M.D. Pager Number [phone]    Patient is a 65y old  Male who presents with a chief complaint of Functional deficits due to multitrauma (06 Aug 2020 11:58)      SUBJECTIVE / OVERNIGHT EVENTS:  Pt seen and examined at bedside. No acute events overnight.  Pt denies cp, palpitations, sob, abd pain, N/V, fever, chills.    ROS:  All other review of systems negative    Allergies    No Known Allergies    Intolerances        MEDICATIONS  (STANDING):  allopurinol 300 milliGRAM(s) Oral daily  ascorbic acid 500 milliGRAM(s) Oral two times a day  atorvastatin 20 milliGRAM(s) Oral at bedtime  erythromycin   Ointment 1 Application(s) Right EYE two times a day  heparin   Injectable 5000 Unit(s) SubCutaneous every 8 hours  lidocaine   Patch 1 Patch Transdermal daily  losartan 50 milliGRAM(s) Oral daily  metoprolol tartrate 12.5 milliGRAM(s) Oral two times a day  multivitamin 1 Tablet(s) Oral daily  pantoprazole    Tablet 40 milliGRAM(s) Oral before breakfast  polyethylene glycol 3350 17 Gram(s) Oral daily  sodium chloride 1 Gram(s) Oral three times a day    MEDICATIONS  (PRN):  acetaminophen   Tablet .. 650 milliGRAM(s) Oral every 6 hours PRN Mild Pain (1 - 3)  bisacodyl 10 milliGRAM(s) Oral at bedtime PRN Constipation  lactulose Syrup 10 Gram(s) Oral daily PRN mild-mod constaipation  melatonin 3 milliGRAM(s) Oral at bedtime PRN Insomnia  ondansetron   Disintegrating Tablet 4 milliGRAM(s) Oral every 6 hours PRN Nausea and/or Vomiting  senna 2 Tablet(s) Oral at bedtime PRN Constipation      Vital Signs Last 24 Hrs  T(C): 36.8 (06 Aug 2020 11:12), Max: 36.8 (06 Aug 2020 11:12)  T(F): 98.3 (06 Aug 2020 11:12), Max: 98.3 (06 Aug 2020 11:12)  HR: 70 (06 Aug 2020 11:12) (63 - 70)  BP: 115/71 (06 Aug 2020 11:12) (104/61 - 122/65)  BP(mean): --  RR: 16 (06 Aug 2020 11:12) (16 - 16)  SpO2: 99% (06 Aug 2020 11:12) (97% - 100%)  CAPILLARY BLOOD GLUCOSE        I&O's Summary      PHYSICAL EXAM:  GENERAL: NAD, well-developed  EYES: Right periorbital edema, partial conjunctival hemorrhage, EOMI, PERRLA    CHEST/LUNG: Clear to auscultation bilaterally; No wheeze  HEART: Regular rate and rhythm; No murmurs, rubs, or gallops  ABDOMEN: Soft, Nontender, Nondistended; Bowel sounds present  EXTREMITIES:  2+ Peripheral Pulses, No clubbing, cyanosis, or edema. RUE in splint   PSYCH: AAOx3  NEUROLOGY: non-focal  SKIN: Multiple bruising on LE    LABS:                        10.1   8.51  )-----------( 327      ( 06 Aug 2020 05:00 )             32.9     08-06    140  |  105  |  19  ----------------------------<  96  4.4   |  28  |  0.98    Ca    8.7      06 Aug 2020 05:00                RADIOLOGY & ADDITIONAL TESTS:  Results Reviewed:   Imaging Personally Reviewed:  Electrocardiogram Personally Reviewed:    COORDINATION OF CARE:  Care Discussed with Consultants/Other Providers [Y/N]:  Prior or Outpatient Records Reviewed [Y/N]:

## 2020-08-06 NOTE — PROGRESS NOTE ADULT - ASSESSMENT
Assessment/Plan:  66yo male with hx of CAD s/p PCI 2017 on ASA/Plavix, BPH presented to [facility] for acute rehab from  [facility] s/p mechanical fall leading to Right orbital fracture, Right wrist fracture as well as SAH    #Debilitation  -Comprehensive Multidisciplinary Rehab Program:  -Continue comprehensive rehab program, 3 hours a day, 5 days a week.  -RUE NWB x 6 weeks from 7/19    #Diffuse SAH, small IVH and small parenchymal contusions  -CT Head results noted  -Continue to hold ASA/Plavix for now    #Orbital fracture  -S/p surgical repair by OMFS  -Patient must not touch lower eyelid. Do not pull eye lids.   -Erythromycin opth ointment 0.5% right eye BID  -Completed course of Augmentin BID per OMFS  -Follow up with Craig Blunt [facility] [phone]    #Radial styloid fracture and triquetrum avulsion fracture  -Sugar tong splint to RUE  -NWB for 6 weeks.     #Hyponatremia:  -Resolved  -Secondary to SIADH due to SAH  -Reduce to NaCl tabs 1gm BID from TID. Goal to taper off while maintaining normal Na levels    #Sleep apnea  -Chronic  -At home CPAP QHS use settings 18  -Unable to start CPAP due to Facial trauma/fracture  -Continue supplemental O2 via NC QHS for comfort  -Monitor vitals and mental status    #Hypertension:  -Chronic well controlled  -Losartan 50mg daily  -Metoprolol 12.5mg BID    #CAD  -s/p PCI 2017  -Atorvastatin  -ASA/Plavix on hold due to IVH, will need f/u with NSX and Cardiology     #Gout:  -Allopurinol 300mg daily    #Incidental Pulmonary Nodule:  -0.5cm pulm nodule of FELISHA can be followed with dedicated chest CT in 12 months    #DVT ppx:  -HSQ    Avoid/Minimize daily blood work

## 2020-08-06 NOTE — PROGRESS NOTE ADULT - ASSESSMENT
Assessment/Plan:  KARLA DEUTSCH is a 65M pmh CAD s/p PCI 2017 on ASA/Plavix, BPH, DM who presented to [facility] on 7/19/20 after fall from boat to dock with + head strike and LOC found to have diffuse SAH, hemorrhagic contusions of the frontal and temporal lobes, intraventricular hemorrhage with extension into the left lateral ventricle, bilateral occipital and temporal horns, as well as right orbital floor fracture s/p ORIF and right radial styloid and triquetrum fractures s/p splinting. Now admitted to [facility] after for initiation of a multidisciplinary rehab program consisting focused on functional mobility, transfers and ADLs (activities of daily living).    #s/p fall, multitrauma, SAH, IVH, hemorrhagic contusions:  - continue comprehensive rehab program PT, OT, SLP 3 hours daily at least 5 x week  - Weight bearing status: NWB RUE x 6 weeks from injury (7/19)  -monitor off AED  -dc O2  - ASA/Plavix held    #Facial Injuries, orbital fx/repair  - Patient must not touch lower eyelid. Do not pull eye lids.   - Erythromycin opth ointment 0.5% right eye BID  - Augmentin BID x 2 more days per OMFS  -patient declines cold compress for eye swelling    #RUE Injuries: Radial styloid fracture and triquetrum avulsion fracture  - Sugar tong splint to RUE  - NWB RUE x 6 weeks.     #Hyponatremia:  - NaCl tabs 1gm TID  - Na 140 8/6    #Hypertension:  - Losartan 50mg daily  - Metoprolol 12.5mg BID    #CAD s/p PCI:  - Atorvastatin  - ASA/Plavix on hold due to IVH, will need f/u with NSX and Cardiology     #Gout:  - Allopurinol 300mg daily    #Incidental Pulmonary Nodule:  - 0.5cm pulm nodule of FELISHA can be followed with dedicated chest CT in 12 months.     #Sleep:  - Melatonin PRN     #Pain Management:  - Tylenol PRN, Lido patch daily to left thigh, consider gabapentin for radicular pain if lido patch isn't sufficiently controlling pain  - Avoid sedating medications that may interfere with cognitive recovery    #GI/Bowel:  - Senna QHS PRN, Dulcolax supp PRN  - Nausea: Zofran PRN  - GI ppx: PPI    #Diet/Dysphagia:  - Regular    #DVT ppx:  - Heparin 5000U TID  - SCDs    #Case discussed in IDT rounds 8/5:  Ambulates 100 feet with supervision 16 steps with sueprvision, set up grooming, set up UE dressing, max assist LE dressing and bathing, min assist toileting. Needs reminders re: NWB status RUE  -goals: mod indepnedent eating and grooming, supervision bathing and toileting, min assist LE dressing, mod indepnedent stairs and ambualtion  -target dc home 8/8 home PT , OT, SLP. Caregiver education and training Assessment/Plan:  KARLA DEUTSCH is a 65M pmh CAD s/p PCI 2017 on ASA/Plavix, BPH, DM who presented to [facility] on 7/19/20 after fall from boat to dock with + head strike and LOC found to have diffuse SAH, hemorrhagic contusions of the frontal and temporal lobes, intraventricular hemorrhage with extension into the left lateral ventricle, bilateral occipital and temporal horns, as well as right orbital floor fracture s/p ORIF and right radial styloid and triquetrum fractures s/p splinting. Now admitted to [facility] after for initiation of a multidisciplinary rehab program consisting focused on functional mobility, transfers and ADLs (activities of daily living).    #s/p fall, multitrauma, SAH, IVH, hemorrhagic contusions:  - continue comprehensive rehab program PT, OT, SLP 3 hours daily at least 5 x week  - Weight bearing status: NWB RUE x 6 weeks from injury (7/19)  -monitor off AED  - ASA/Plavix held    #Facial Injuries, orbital fx/repair  - Patient must not touch lower eyelid. Do not pull eye lids.   - Erythromycin opth ointment 0.5% right eye BID  -patient declines cold compress for eye swelling  -hold off on biPAP for now due to above, discussed with family    #RUE Injuries: Radial styloid fracture and triquetrum avulsion fracture  - Sugar tong splint to RUE  - NWB RUE x 6 weeks.   -ortho f/u on dc    #Hyponatremia:  - NaCl tabs 1gm TID  - Na 140 8/6    #Hypertension:  - Losartan 50mg daily  - Metoprolol 12.5mg BID  -controlled    #CAD s/p PCI:  - Atorvastatin  - ASA/Plavix on hold due to IVH, will need f/u with NSX and Cardiology on dc    #Gout:  - Allopurinol 300mg daily    #Incidental Pulmonary Nodule:  - 0.5cm pulm nodule of FELISHA can be followed with dedicated chest CT in 12 months.     #Sleep:  - Melatonin PRN     #Pain Management:  - Tylenol PRN, Lido patch daily to left thigh, consider gabapentin for radicular pain if lido patch isn't sufficiently controlling pain  - Avoid sedating medications that may interfere with cognitive recovery    #GI/Bowel:  - Senna QHS PRN, Dulcolax supp PRN  - Nausea: Zofran PRN  - GI ppx: PPI    #Diet/Dysphagia:  - Regular    #DVT ppx:  - Heparin 5000U TID  - SCDs    #Case discussed in IDT rounds 8/5:  Ambulates 100 feet with supervision 16 steps with sueprvision, set up grooming, set up UE dressing, max assist LE dressing and bathing, min assist toileting. Needs reminders re: NWB status RUE  -goals: mod indepnedent eating and grooming, supervision bathing and toileting, min assist LE dressing, mod indepnedent stairs and ambualtion  -target dc home 8/8 home PT , OT, SLP. Caregiver education and training  -[facility] paperwork completed 8/6    Labs: 8.6 reviewed and stable

## 2020-08-07 PROCEDURE — 99232 SBSQ HOSP IP/OBS MODERATE 35: CPT

## 2020-08-07 RX ORDER — SODIUM CHLORIDE 9 MG/ML
1 INJECTION INTRAMUSCULAR; INTRAVENOUS; SUBCUTANEOUS DAILY
Refills: 0 | Status: DISCONTINUED | OUTPATIENT
Start: 2020-08-07 | End: 2020-08-08

## 2020-08-07 RX ORDER — CLOPIDOGREL BISULFATE 75 MG/1
1 TABLET, FILM COATED ORAL
Qty: 0 | Refills: 0 | DISCHARGE

## 2020-08-07 RX ORDER — ASCORBIC ACID 60 MG
1 TABLET,CHEWABLE ORAL
Qty: 0 | Refills: 0 | DISCHARGE
Start: 2020-08-07

## 2020-08-07 RX ORDER — ASPIRIN/CALCIUM CARB/MAGNESIUM 324 MG
1 TABLET ORAL
Qty: 0 | Refills: 0 | DISCHARGE

## 2020-08-07 RX ORDER — RANOLAZINE 500 MG/1
1 TABLET, FILM COATED, EXTENDED RELEASE ORAL
Qty: 0 | Refills: 0 | DISCHARGE

## 2020-08-07 RX ORDER — SODIUM CHLORIDE 9 MG/ML
1 INJECTION INTRAMUSCULAR; INTRAVENOUS; SUBCUTANEOUS
Qty: 30 | Refills: 0
Start: 2020-08-07 | End: 2020-09-05

## 2020-08-07 RX ORDER — ERYTHROMYCIN BASE 5 MG/GRAM
1 OINTMENT (GRAM) OPHTHALMIC (EYE)
Qty: 1 | Refills: 0
Start: 2020-08-07

## 2020-08-07 RX ORDER — LOSARTAN POTASSIUM 100 MG/1
1 TABLET, FILM COATED ORAL
Qty: 30 | Refills: 0
Start: 2020-08-07 | End: 2020-09-05

## 2020-08-07 RX ADMIN — SODIUM CHLORIDE 1 GRAM(S): 9 INJECTION INTRAMUSCULAR; INTRAVENOUS; SUBCUTANEOUS at 05:26

## 2020-08-07 RX ADMIN — ATORVASTATIN CALCIUM 20 MILLIGRAM(S): 80 TABLET, FILM COATED ORAL at 21:21

## 2020-08-07 RX ADMIN — Medication 1 APPLICATION(S): at 17:40

## 2020-08-07 RX ADMIN — LIDOCAINE 1 PATCH: 4 CREAM TOPICAL at 19:19

## 2020-08-07 RX ADMIN — Medication 12.5 MILLIGRAM(S): at 17:40

## 2020-08-07 RX ADMIN — Medication 300 MILLIGRAM(S): at 12:11

## 2020-08-07 RX ADMIN — SODIUM CHLORIDE 1 GRAM(S): 9 INJECTION INTRAMUSCULAR; INTRAVENOUS; SUBCUTANEOUS at 12:11

## 2020-08-07 RX ADMIN — Medication 500 MILLIGRAM(S): at 17:40

## 2020-08-07 RX ADMIN — HEPARIN SODIUM 5000 UNIT(S): 5000 INJECTION INTRAVENOUS; SUBCUTANEOUS at 16:00

## 2020-08-07 RX ADMIN — Medication 12.5 MILLIGRAM(S): at 05:26

## 2020-08-07 RX ADMIN — LOSARTAN POTASSIUM 50 MILLIGRAM(S): 100 TABLET, FILM COATED ORAL at 05:26

## 2020-08-07 RX ADMIN — Medication 500 MILLIGRAM(S): at 05:26

## 2020-08-07 RX ADMIN — HEPARIN SODIUM 5000 UNIT(S): 5000 INJECTION INTRAVENOUS; SUBCUTANEOUS at 05:26

## 2020-08-07 RX ADMIN — Medication 1 TABLET(S): at 12:11

## 2020-08-07 RX ADMIN — LIDOCAINE 1 PATCH: 4 CREAM TOPICAL at 12:11

## 2020-08-07 RX ADMIN — LIDOCAINE 1 PATCH: 4 CREAM TOPICAL at 01:23

## 2020-08-07 RX ADMIN — PANTOPRAZOLE SODIUM 40 MILLIGRAM(S): 20 TABLET, DELAYED RELEASE ORAL at 05:26

## 2020-08-07 RX ADMIN — HEPARIN SODIUM 5000 UNIT(S): 5000 INJECTION INTRAVENOUS; SUBCUTANEOUS at 21:21

## 2020-08-07 RX ADMIN — Medication 1 APPLICATION(S): at 05:26

## 2020-08-07 NOTE — PROGRESS NOTE ADULT - ASSESSMENT
Assessment/Plan:  64yo male with hx of CAD s/p PCI 2017 on ASA/Plavix, BPH presented to [facility] for acute rehab from  [facility] s/p mechanical fall leading to Right orbital fracture, Right wrist fracture as well as SAH    #Debilitation  -Comprehensive Multidisciplinary Rehab Program:  -Continue comprehensive rehab program, 3 hours a day, 5 days a week.  -RUE NWB x 6 weeks from 7/19    #Diffuse SAH, small IVH and small parenchymal contusions  -CT Head results noted  -Continue to hold ASA/Plavix for now    #Orbital fracture  -S/p surgical repair by OMFS  -Patient must not touch lower eyelid. Do not pull eye lids.   -Erythromycin opth ointment 0.5% right eye BID  -Completed course of Augmentin BID per OMFS  -Follow up with Craig Blunt [facility] [phone]    #Radial styloid fracture and triquetrum avulsion fracture  -Sugar tong splint to RUE  -NWB for 6 weeks.     #Hyponatremia:  -Resolved  -Secondary to SIADH due to SAH  -Reduce to NaCl tabs 1gm BID from TID. Goal to taper off while maintaining normal Na levels    #Sleep apnea  -Chronic  -At home CPAP QHS use settings 18  -Unable to start CPAP due to Facial trauma/fracture  -Continue supplemental O2 via NC QHS for comfort  -Monitor vitals and mental status    #Hypertension:  -Chronic well controlled  -Losartan 50mg daily  -Metoprolol 12.5mg BID    #CAD  -s/p PCI 2017  -Atorvastatin  -ASA/Plavix on hold due to IVH, will need f/u with NSX and Cardiology     #Gout:  -Allopurinol 300mg daily    #Incidental Pulmonary Nodule:  -0.5cm pulm nodule of FELISHA can be followed with dedicated chest CT in 12 months    #DVT ppx:  -HSQ    Avoid/Minimize daily blood work

## 2020-08-07 NOTE — PROGRESS NOTE ADULT - SUBJECTIVE AND OBJECTIVE BOX
Osbaldo Payton M.D. Pager Number [phone]    Patient is a 65y old  Male who presents with a chief complaint of Functional deficits due to multitrauma (07 Aug 2020 10:58)      SUBJECTIVE / OVERNIGHT EVENTS:  Pt seen and examined at bedside. No acute events overnight.  Pt denies cp, palpitations, sob, abd pain, N/V, fever, chills.    ROS:  All other review of systems negative    Allergies    No Known Allergies    Intolerances        MEDICATIONS  (STANDING):  allopurinol 300 milliGRAM(s) Oral daily  ascorbic acid 500 milliGRAM(s) Oral two times a day  atorvastatin 20 milliGRAM(s) Oral at bedtime  erythromycin   Ointment 1 Application(s) Right EYE two times a day  heparin   Injectable 5000 Unit(s) SubCutaneous every 8 hours  lidocaine   Patch 1 Patch Transdermal daily  losartan 50 milliGRAM(s) Oral daily  metoprolol tartrate 12.5 milliGRAM(s) Oral two times a day  multivitamin 1 Tablet(s) Oral daily  pantoprazole    Tablet 40 milliGRAM(s) Oral before breakfast  polyethylene glycol 3350 17 Gram(s) Oral daily  sodium chloride 1 Gram(s) Oral daily    MEDICATIONS  (PRN):  acetaminophen   Tablet .. 650 milliGRAM(s) Oral every 6 hours PRN Mild Pain (1 - 3)  bisacodyl 10 milliGRAM(s) Oral at bedtime PRN Constipation  lactulose Syrup 10 Gram(s) Oral daily PRN mild-mod constaipation  melatonin 3 milliGRAM(s) Oral at bedtime PRN Insomnia  ondansetron   Disintegrating Tablet 4 milliGRAM(s) Oral every 6 hours PRN Nausea and/or Vomiting  senna 2 Tablet(s) Oral at bedtime PRN Constipation      Vital Signs Last 24 Hrs  T(C): 36.6 (07 Aug 2020 08:38), Max: 36.8 (06 Aug 2020 17:27)  T(F): 97.9 (07 Aug 2020 08:38), Max: 98.3 (06 Aug 2020 17:27)  HR: 72 (07 Aug 2020 08:38) (72 - 76)  BP: 122/74 (07 Aug 2020 08:38) (122/74 - 134/71)  BP(mean): --  RR: 14 (07 Aug 2020 08:38) (14 - 16)  SpO2: 97% (07 Aug 2020 08:38) (96% - 100%)  CAPILLARY BLOOD GLUCOSE        I&O's Summary      PHYSICAL EXAM:  GENERAL: NAD, well-developed  EYES: Right periorbital edema, partial conjunctival hemorrhage, EOMI, PERRLA    CHEST/LUNG: Clear to auscultation bilaterally; No wheeze  HEART: Regular rate and rhythm; No murmurs, rubs, or gallops  ABDOMEN: Soft, Nontender, Nondistended; Bowel sounds present  EXTREMITIES:  2+ Peripheral Pulses, No clubbing, cyanosis, or edema. RUE in splint   PSYCH: AAOx3  NEUROLOGY: non-focal  SKIN: Multiple bruising on LE    LABS:                        10.1   8.51  )-----------( 327      ( 06 Aug 2020 05:00 )             32.9     08-06    140  |  105  |  19  ----------------------------<  96  4.4   |  28  |  0.98    Ca    8.7      06 Aug 2020 05:00                RADIOLOGY & ADDITIONAL TESTS:  Results Reviewed:   Imaging Personally Reviewed:  Electrocardiogram Personally Reviewed:    COORDINATION OF CARE:  Care Discussed with Consultants/Other Providers [Y/N]:  Prior or Outpatient Records Reviewed [Y/N]:

## 2020-08-07 NOTE — PROGRESS NOTE ADULT - SUBJECTIVE AND OBJECTIVE BOX
DAILY PROGRESS NOTE:  HPI:  65M pmh CAD s/p PCI 2017 on ASA/Plavix, BPH, DM fell face first from standing while he was getting off his boat onto a dock on 7/19/20. Patient is amnestic of events, +LOC, + Head strike. He had significant soft tissue injury to the right eye with possible globe injury. CT revealed right orbital fractures, diffuse SAH, small IVH and small parenchymal contusions, right radial styloid and right triquetrum avulsion fracture.     Ophthal consulted for concern for right globe rupture - no evidence on exam per ophthal. Although IOP OD is elevated (35-40 on multiple reliable measurements), there is no APD and the vision is 20/30. Recommended erythomycin ophthalmic nas BID OD for ocular lubrication. No acute intervention from opthalmology. For right radial styloid and right triquetrum avulsion fracture no surgical intervention at this time. Non-weight bearing on RUE and sugar tong splint.     7/27 OR with OMFS for ORIF right orbital floor fracture via subciliary approach. Post-op OMFS reconsulted ophthalmology because patient had blurry vision right eye. As per ophthal, no acute change.     No surgical intervention for right wrist fracture. Patient will be NWB for 6 weeks in a splint/case.     Patient seen and examined at bedside, wife present. Patient reports left sided sciatic pain - was present prior to fall, comes and goes, 3/10, described as sharp radiates from left buttock to back of left knee. Lido patch has been helping. (01 Aug 2020 12:32)      Subjective:  Patient was seen and examined at the bedside this AM. No acute overnight events. Patient doing well, no complaints. Excited to be going home tomorrow. Caregiver training with his wife yesterday afternoon; states it went "well". Patient reminded to avoid weight-bearing in right forearm even if he does not have pain. Informed he can use CPAP at night, as per Dr. Blunt's office.       Physical Exam:  Vital Signs Last 24 Hrs  T(C): 36.6 (07 Aug 2020 08:38), Max: 36.8 (06 Aug 2020 11:12)  T(F): 97.9 (07 Aug 2020 08:38), Max: 98.3 (06 Aug 2020 11:12)  HR: 72 (07 Aug 2020 08:38) (70 - 76)  BP: 122/74 (07 Aug 2020 08:38) (115/71 - 134/71)  RR: 14 (07 Aug 2020 08:38) (14 - 16)  SpO2: 97% (07 Aug 2020 08:38) (96% - 100%)      Physical Exam:   · Constitutional	detailed exam	  · Constitutional Comments	alert NAD grossly O x 3.  mood stable, eager for dc home. Improved recall, attention, insight	  · Eyes	detailed exam	  · Eyes Comments	injected right sclera. EOMI; Swelling and ecchymoses continues to improve	  · Respiratory	detailed exam	  · Respiratory Details	good air movement; respirations non-labored; clear to auscultation bilaterally	  · Cardiovascular	detailed exam	  · Cardiovascular Details	regular rate and rhythm	  · Gastrointestinal	detailed exam	  · GI Normal	soft; nontender; bowel sounds normal	  · Extremities	detailed exam	  · Extremities Comments	right UE in long arm splint no hand edema, wiggles fingers, no sensory deficits  bilateral calves soft, NT	      Recent Labs/Imaging:                        10.1   8.51  )-----------( 327      ( 06 Aug 2020 05:00 )             32.9     08-06    140  |  105  |  19  ----------------------------<  96  4.4   |  28  |  0.98    Ca    8.7      06 Aug 2020 05:00      Medications:  acetaminophen   Tablet .. 650 milliGRAM(s) Oral every 6 hours PRN  allopurinol 300 milliGRAM(s) Oral daily  ascorbic acid 500 milliGRAM(s) Oral two times a day  atorvastatin 20 milliGRAM(s) Oral at bedtime  bisacodyl 10 milliGRAM(s) Oral at bedtime PRN  erythromycin   Ointment 1 Application(s) Right EYE two times a day  heparin   Injectable 5000 Unit(s) SubCutaneous every 8 hours  lactulose Syrup 10 Gram(s) Oral daily PRN  lidocaine   Patch 1 Patch Transdermal daily  losartan 50 milliGRAM(s) Oral daily  melatonin 3 milliGRAM(s) Oral at bedtime PRN  metoprolol tartrate 12.5 milliGRAM(s) Oral two times a day  multivitamin 1 Tablet(s) Oral daily  ondansetron   Disintegrating Tablet 4 milliGRAM(s) Oral every 6 hours PRN  pantoprazole    Tablet 40 milliGRAM(s) Oral before breakfast  polyethylene glycol 3350 17 Gram(s) Oral daily  senna 2 Tablet(s) Oral at bedtime PRN  sodium chloride 1 Gram(s) Oral daily DAILY PROGRESS NOTE:  HPI:  65M pmh CAD s/p PCI 2017 on ASA/Plavix, BPH, DM fell face first from standing while he was getting off his boat onto a dock on 7/19/20. Patient is amnestic of events, +LOC, + Head strike. He had significant soft tissue injury to the right eye with possible globe injury. CT revealed right orbital fractures, diffuse SAH, small IVH and small parenchymal contusions, right radial styloid and right triquetrum avulsion fracture.     Ophthal consulted for concern for right globe rupture - no evidence on exam per ophthal. Although IOP OD is elevated (35-40 on multiple reliable measurements), there is no APD and the vision is 20/30. Recommended erythomycin ophthalmic nas BID OD for ocular lubrication. No acute intervention from opthalmology. For right radial styloid and right triquetrum avulsion fracture no surgical intervention at this time. Non-weight bearing on RUE and sugar tong splint.     7/27 OR with OMFS for ORIF right orbital floor fracture via subciliary approach. Post-op OMFS reconsulted ophthalmology because patient had blurry vision right eye. As per ophthal, no acute change.     No surgical intervention for right wrist fracture. Patient will be NWB for 6 weeks in a splint/case.     Patient seen and examined at bedside, wife present. Patient reports left sided sciatic pain - was present prior to fall, comes and goes, 3/10, described as sharp radiates from left buttock to back of left knee. Lido patch has been helping. (01 Aug 2020 12:32)      Subjective:  Patient was seen and examined at the bedside this AM. No acute overnight events. Patient doing well, no complaints. Excited to be going home tomorrow. Caregiver training with his wife yesterday afternoon; states it went "well". Patient reminded to avoid weight-bearing in right forearm even if he does not have pain. Informed he can use CPAP at night, as per Dr. Blunt's office.     Patient was cleared by OMFS for use of CPAP mask. However, patient also instructed that if he develops increased swelling/hemorrhage eye/pain to stop using until re-evaluated. Verbalized understanding and agreement    Physical Exam:  Vital Signs Last 24 Hrs  T(C): 36.6 (07 Aug 2020 08:38), Max: 36.8 (06 Aug 2020 11:12)  T(F): 97.9 (07 Aug 2020 08:38), Max: 98.3 (06 Aug 2020 11:12)  HR: 72 (07 Aug 2020 08:38) (70 - 76)  BP: 122/74 (07 Aug 2020 08:38) (115/71 - 134/71)  RR: 14 (07 Aug 2020 08:38) (14 - 16)  SpO2: 97% (07 Aug 2020 08:38) (96% - 100%)      Physical Exam:   · Constitutional	detailed exam	  · Constitutional Comments	alert NAD grossly O x 3.  mood stable, eager for dc home. Improved recall, attention, insight	  · Eyes	detailed exam	  · Eyes Comments	 EOMI; Continues to have improvement in subconjunctival hemorrahge, no new areas, continued reduced swelling infraorbital area and ecchymoses	  · Respiratory	detailed exam	  · Respiratory Details	good air movement; respirations non-labored; clear to auscultation bilaterally	  · Cardiovascular	detailed exam	  · Cardiovascular Details	regular rate and rhythm	  · Gastrointestinal	detailed exam	  · GI Normal	soft; nontender; bowel sounds normal	  · Extremities	detailed exam	  · Extremities Comments	right UE in long arm splint no hand edema, wiggles fingers, no sensory deficits  bilateral calves soft, NT	      Recent Labs/Imaging:                        10.1   8.51  )-----------( 327      ( 06 Aug 2020 05:00 )             32.9     08-06    140  |  105  |  19  ----------------------------<  96  4.4   |  28  |  0.98    Ca    8.7      06 Aug 2020 05:00      Medications:  acetaminophen   Tablet .. 650 milliGRAM(s) Oral every 6 hours PRN  allopurinol 300 milliGRAM(s) Oral daily  ascorbic acid 500 milliGRAM(s) Oral two times a day  atorvastatin 20 milliGRAM(s) Oral at bedtime  bisacodyl 10 milliGRAM(s) Oral at bedtime PRN  erythromycin   Ointment 1 Application(s) Right EYE two times a day  heparin   Injectable 5000 Unit(s) SubCutaneous every 8 hours  lactulose Syrup 10 Gram(s) Oral daily PRN  lidocaine   Patch 1 Patch Transdermal daily  losartan 50 milliGRAM(s) Oral daily  melatonin 3 milliGRAM(s) Oral at bedtime PRN  metoprolol tartrate 12.5 milliGRAM(s) Oral two times a day  multivitamin 1 Tablet(s) Oral daily  ondansetron   Disintegrating Tablet 4 milliGRAM(s) Oral every 6 hours PRN  pantoprazole    Tablet 40 milliGRAM(s) Oral before breakfast  polyethylene glycol 3350 17 Gram(s) Oral daily  senna 2 Tablet(s) Oral at bedtime PRN  sodium chloride 1 Gram(s) Oral daily

## 2020-08-07 NOTE — PROGRESS NOTE ADULT - ASSESSMENT
Assessment/Plan:  KARLA DEUTSCH is a 65M pmh CAD s/p PCI 2017 on ASA/Plavix, BPH, DM who presented to [facility] on 7/19/20 after fall from boat to dock with + head strike and LOC found to have diffuse SAH, hemorrhagic contusions of the frontal and temporal lobes, intraventricular hemorrhage with extension into the left lateral ventricle, bilateral occipital and temporal horns, as well as right orbital floor fracture s/p ORIF and right radial styloid and triquetrum fractures s/p splinting. Now admitted to [facility] after for initiation of a multidisciplinary rehab program consisting focused on functional mobility, transfers and ADLs (activities of daily living).    #s/p fall, multitrauma, SAH, IVH, hemorrhagic contusions:  - continue comprehensive rehab program PT, OT, SLP 3 hours daily at least 5 x week  - Weight bearing status: NWB RUE x 6 weeks from injury (7/19)  -monitor off AED  - ASA/Plavix held    #Facial Injuries, orbital fx/repair  - Patient must not touch lower eyelid. Do not pull eye lids.   - Erythromycin opth ointment 0.5% right eye BID  -patient declines cold compress for eye swelling  -per Dr. Blutn, may use CPAP at night    #RUE Injuries: Radial styloid fracture and triquetrum avulsion fracture  - Sugar tong splint to RUE  - NWB RUE x 6 weeks.   -ortho f/u on dc    #Hyponatremia:  - NaCl tabs 1gm once daily  - Na 140 8/6    #Hypertension:  - Losartan 50mg daily  - Metoprolol 12.5mg BID  -controlled    #CAD s/p PCI:  - Atorvastatin  - ASA/Plavix on hold due to IVH, will need f/u with NSX and Cardiology on dc    #Gout:  - Allopurinol 300mg daily    #Incidental Pulmonary Nodule:  - 0.5cm pulm nodule of FELISHA can be followed with dedicated chest CT in 12 months.     #Sleep:  - Melatonin PRN     #Pain Management:  - Tylenol PRN, Lido patch daily to left thigh, consider gabapentin for radicular pain if lido patch isn't sufficiently controlling pain  - Avoid sedating medications that may interfere with cognitive recovery    #GI/Bowel:  - Senna QHS PRN, Dulcolax supp PRN  - Nausea: Zofran PRN  - GI ppx: PPI    #Diet/Dysphagia:  - Regular    #DVT ppx:  - Heparin 5000U TID  - SCDs    #Case discussed in IDT rounds 8/5:  Ambulates 100 feet with supervision 16 steps with sueprvision, set up grooming, set up UE dressing, max assist LE dressing and bathing, min assist toileting. Needs reminders re: NWB status RUE  -goals: mod indepnedent eating and grooming, supervision bathing and toileting, min assist LE dressing, mod indepnedent stairs and ambualtion  -target dc home 8/8 home PT , OT, SLP. Caregiver education and training  -[facility] paperwork completed 8/6    Labs: 8.6 reviewed and stable Assessment/Plan:  KARLA DEUTSCH is a 65M pmh CAD s/p PCI 2017 on ASA/Plavix, BPH, DM who presented to [facility] on 7/19/20 after fall from boat to dock with + head strike and LOC found to have diffuse SAH, hemorrhagic contusions of the frontal and temporal lobes, intraventricular hemorrhage with extension into the left lateral ventricle, bilateral occipital and temporal horns, as well as right orbital floor fracture s/p ORIF and right radial styloid and triquetrum fractures s/p splinting. Now admitted to [facility] after for initiation of a multidisciplinary rehab program consisting focused on functional mobility, transfers and ADLs (activities of daily living).    #s/p fall, multitrauma, SAH, IVH, hemorrhagic contusions:  - continue comprehensive rehab program PT, OT, SLP 3 hours daily at least 5 x week  - Weight bearing status: NWB RUE x 6 weeks from injury (7/19)  -monitor off AED  - ASA/Plavix held    #Facial Injuries, orbital fx/repair  - Patient must not touch lower eyelid. Do not pull eye lids.   - Erythromycin opth ointment 0.5% right eye BID. to continue on dc pending OMFS f/u  -patient declines cold compress for eye swelling  -per Dr. Blunt, may use CPAP at night. Discussed with patient, if develops increased swelling/pain/subconjuntival hemorrhage, to dc    #RUE Injuries: Radial styloid fracture and triquetrum avulsion fracture  - Sugar tong splint to RUE  - NWB RUE x 6 weeks.   -ortho f/u on dc, reviewed with patient    #Hyponatremia:  - NaCl tabs decrease to 1gm once daily  - Na 140 8/6  -PCP f/u on dc for dc    #Hypertension:  - Losartan 50mg daily  - Metoprolol 12.5mg BID  -controlled. PCP f/u on dc    #CAD s/p PCI:  - Atorvastatin  - ASA/Plavix on hold due to IVH, will need f/u with NSX and Cardiology on dc    #Gout:  - Allopurinol 300mg daily    #Incidental Pulmonary Nodule:  - 0.5cm pulm nodule of FELISHA can be followed with dedicated chest CT in 12 months.     #Sleep:  - Melatonin PRN     #Pain Management:  - Tylenol PRN    #GI/Bowel:  - Senna QHS PRN, Dulcolax supp PRN  - GI ppx: PPI    #Diet/Dysphagia:  - Regular    #DVT ppx:  - Heparin 5000U TID  - SCDs    #Case discussed in IDT rounds 8/5:  Ambulates 100 feet with supervision 16 steps with sueprvision, set up grooming, set up UE dressing, max assist LE dressing and bathing, min assist toileting. Needs reminders re: NWB status RUE  -goals: mod indepnedent eating and grooming, supervision bathing and toileting, min assist LE dressing, mod indepnedent stairs and ambualtion  -target dc home 8/8 home PT , OT, SLP. Caregiver education and training  -[facility] paperwork completed 8/6

## 2020-08-08 VITALS
HEART RATE: 62 BPM | SYSTOLIC BLOOD PRESSURE: 124 MMHG | DIASTOLIC BLOOD PRESSURE: 78 MMHG | TEMPERATURE: 98 F | RESPIRATION RATE: 16 BRPM | OXYGEN SATURATION: 98 %

## 2020-08-08 PROCEDURE — 80048 BASIC METABOLIC PNL TOTAL CA: CPT

## 2020-08-08 PROCEDURE — 97110 THERAPEUTIC EXERCISES: CPT

## 2020-08-08 PROCEDURE — 99238 HOSP IP/OBS DSCHRG MGMT 30/<: CPT

## 2020-08-08 PROCEDURE — 97116 GAIT TRAINING THERAPY: CPT

## 2020-08-08 PROCEDURE — 92523 SPEECH SOUND LANG COMPREHEN: CPT

## 2020-08-08 PROCEDURE — 97112 NEUROMUSCULAR REEDUCATION: CPT

## 2020-08-08 PROCEDURE — 83036 HEMOGLOBIN GLYCOSYLATED A1C: CPT

## 2020-08-08 PROCEDURE — 86803 HEPATITIS C AB TEST: CPT

## 2020-08-08 PROCEDURE — 85027 COMPLETE CBC AUTOMATED: CPT

## 2020-08-08 PROCEDURE — 97163 PT EVAL HIGH COMPLEX 45 MIN: CPT

## 2020-08-08 PROCEDURE — 97530 THERAPEUTIC ACTIVITIES: CPT

## 2020-08-08 PROCEDURE — 99232 SBSQ HOSP IP/OBS MODERATE 35: CPT

## 2020-08-08 PROCEDURE — 92507 TX SP LANG VOICE COMM INDIV: CPT

## 2020-08-08 PROCEDURE — 97535 SELF CARE MNGMENT TRAINING: CPT

## 2020-08-08 PROCEDURE — 82962 GLUCOSE BLOOD TEST: CPT

## 2020-08-08 PROCEDURE — U0003: CPT

## 2020-08-08 PROCEDURE — 80053 COMPREHEN METABOLIC PANEL: CPT

## 2020-08-08 PROCEDURE — 36415 COLL VENOUS BLD VENIPUNCTURE: CPT

## 2020-08-08 PROCEDURE — 97167 OT EVAL HIGH COMPLEX 60 MIN: CPT

## 2020-08-08 RX ADMIN — LIDOCAINE 1 PATCH: 4 CREAM TOPICAL at 12:41

## 2020-08-08 RX ADMIN — Medication 300 MILLIGRAM(S): at 12:41

## 2020-08-08 RX ADMIN — SODIUM CHLORIDE 1 GRAM(S): 9 INJECTION INTRAMUSCULAR; INTRAVENOUS; SUBCUTANEOUS at 12:41

## 2020-08-08 RX ADMIN — PANTOPRAZOLE SODIUM 40 MILLIGRAM(S): 20 TABLET, DELAYED RELEASE ORAL at 05:35

## 2020-08-08 RX ADMIN — HEPARIN SODIUM 5000 UNIT(S): 5000 INJECTION INTRAVENOUS; SUBCUTANEOUS at 05:35

## 2020-08-08 RX ADMIN — Medication 1 TABLET(S): at 12:41

## 2020-08-08 RX ADMIN — Medication 500 MILLIGRAM(S): at 05:35

## 2020-08-08 RX ADMIN — LIDOCAINE 1 PATCH: 4 CREAM TOPICAL at 00:09

## 2020-08-08 RX ADMIN — Medication 1 APPLICATION(S): at 05:37

## 2020-08-08 RX ADMIN — LOSARTAN POTASSIUM 50 MILLIGRAM(S): 100 TABLET, FILM COATED ORAL at 05:35

## 2020-08-08 RX ADMIN — Medication 12.5 MILLIGRAM(S): at 05:35

## 2020-08-08 NOTE — PROGRESS NOTE ADULT - ASSESSMENT
Assessment/Plan:  64yo male with hx of CAD s/p PCI 2017 on ASA/Plavix, BPH presented to [facility] for acute rehab from  [place] s/p mechanical fall leading to Right orbital fracture, Right wrist fracture as well as SAH    #Debilitation  -Comprehensive Multidisciplinary Rehab Program:  -Continue comprehensive rehab program, 3 hours a day, 5 days a week.  -RUE NWB x 6 weeks from 7/19    #Diffuse SAH, small IVH and small parenchymal contusions  -CT Head results noted  -Continue to hold ASA/Plavix for now    #Orbital fracture  -S/p surgical repair by OMFS  -Patient must not touch lower eyelid. Do not pull eye lids.   -Erythromycin opth ointment 0.5% right eye BID  -Completed course of Augmentin BID per OMFS  -Follow up with Craig Blunt [facility] [phone]    #Radial styloid fracture and triquetrum avulsion fracture  -Sugar tong splint to RUE  -NWB for 6 weeks.     #Hyponatremia:  -Resolved  -Secondary to SIADH due to SAH  -Reduce to NaCl tabs 1gm daily. Goal to taper off while maintaining normal Na levels    #Sleep apnea  -Chronic  -At home CPAP QHS use settings 18  -Unable to start CPAP due to Facial trauma/fracture  -Continue supplemental O2 via NC QHS for comfort  -Monitor vitals and mental status    #Hypertension:  -Chronic well controlled  -Losartan 50mg daily  -Metoprolol 12.5mg BID    #CAD  -s/p PCI 2017  -Atorvastatin  -ASA/Plavix on hold due to IVH, will need f/u with NSX and Cardiology     #Gout:  -Allopurinol 300mg daily    #Incidental Pulmonary Nodule:  -0.5cm pulm nodule of FELISHA can be followed with dedicated chest CT in 12 months    #DVT ppx:  -HSQ    Avoid/Minimize daily blood work

## 2020-08-08 NOTE — PROGRESS NOTE ADULT - SUBJECTIVE AND OBJECTIVE BOX
Osbaldo Payton M.D. Pager Number [phone]    Patient is a 65y old  Male who presents with a chief complaint of Functional deficits due to multitrauma (08 Aug 2020 09:33)      SUBJECTIVE / OVERNIGHT EVENTS:  Pt seen and examined at bedside. No acute events overnight.  Pt denies cp, palpitations, sob, abd pain, N/V, fever, chills.    ROS:  All other review of systems negative    Allergies    No Known Allergies    Intolerances        MEDICATIONS  (STANDING):  allopurinol 300 milliGRAM(s) Oral daily  ascorbic acid 500 milliGRAM(s) Oral two times a day  atorvastatin 20 milliGRAM(s) Oral at bedtime  erythromycin   Ointment 1 Application(s) Right EYE two times a day  heparin   Injectable 5000 Unit(s) SubCutaneous every 8 hours  lidocaine   Patch 1 Patch Transdermal daily  losartan 50 milliGRAM(s) Oral daily  metoprolol tartrate 12.5 milliGRAM(s) Oral two times a day  multivitamin 1 Tablet(s) Oral daily  pantoprazole    Tablet 40 milliGRAM(s) Oral before breakfast  polyethylene glycol 3350 17 Gram(s) Oral daily  sodium chloride 1 Gram(s) Oral daily    MEDICATIONS  (PRN):  acetaminophen   Tablet .. 650 milliGRAM(s) Oral every 6 hours PRN Mild Pain (1 - 3)  bisacodyl 10 milliGRAM(s) Oral at bedtime PRN Constipation  lactulose Syrup 10 Gram(s) Oral daily PRN mild-mod constaipation  melatonin 3 milliGRAM(s) Oral at bedtime PRN Insomnia  ondansetron   Disintegrating Tablet 4 milliGRAM(s) Oral every 6 hours PRN Nausea and/or Vomiting  senna 2 Tablet(s) Oral at bedtime PRN Constipation      Vital Signs Last 24 Hrs  T(C): 36.5 (08 Aug 2020 08:36), Max: 36.7 (07 Aug 2020 21:17)  T(F): 97.7 (08 Aug 2020 08:36), Max: 98.1 (07 Aug 2020 21:17)  HR: 62 (08 Aug 2020 08:36) (62 - 71)  BP: 124/78 (08 Aug 2020 08:36) (121/70 - 132/75)  BP(mean): --  RR: 16 (08 Aug 2020 08:36) (16 - 16)  SpO2: 98% (08 Aug 2020 08:36) (98% - 99%)  CAPILLARY BLOOD GLUCOSE        I&O's Summary    07 Aug 2020 07:01  -  08 Aug 2020 07:00  --------------------------------------------------------  IN: 0 mL / OUT: 800 mL / NET: -800 mL        PHYSICAL EXAM:  GENERAL: NAD, well-developed  EYES: Right periorbital edema, partial conjunctival hemorrhage, EOMI, PERRLA    CHEST/LUNG: Clear to auscultation bilaterally; No wheeze  HEART: Regular rate and rhythm; No murmurs, rubs, or gallops  ABDOMEN: Soft, Nontender, Nondistended; Bowel sounds present  EXTREMITIES:  2+ Peripheral Pulses, No clubbing, cyanosis, or edema. RUE in splint   PSYCH: AAOx3  NEUROLOGY: non-focal  SKIN: Multiple bruising on LE    LABS:                    RADIOLOGY & ADDITIONAL TESTS:  Results Reviewed:   Imaging Personally Reviewed:  Electrocardiogram Personally Reviewed:    COORDINATION OF CARE:  Care Discussed with Consultants/Other Providers [Y/N]:  Prior or Outpatient Records Reviewed [Y/N]:

## 2020-08-08 NOTE — PROGRESS NOTE ADULT - REASON FOR ADMISSION
Functional deficits due to multitrauma

## 2020-08-08 NOTE — PROGRESS NOTE ADULT - SUBJECTIVE AND OBJECTIVE BOX
Plan for DC home today.    REVIEW OF SYSTEMS  Constitutional - No fever,  No fatigue  Neurological - No headaches, +loss of strength  Musculoskeletal - +joint pain, No joint swelling, No muscle pain    VITALS  T(C): 36.5 (08-08-20 @ 08:36), Max: 36.7 (08-07-20 @ 21:17)  HR: 62 (08-08-20 @ 08:36) (62 - 71)  BP: 124/78 (08-08-20 @ 08:36) (121/70 - 132/75)  RR: 16 (08-08-20 @ 08:36) (16 - 16)  SpO2: 98% (08-08-20 @ 08:36) (98% - 99%)  Wt(kg): --       MEDICATIONS   acetaminophen   Tablet .. 650 milliGRAM(s) every 6 hours PRN  allopurinol 300 milliGRAM(s) daily  ascorbic acid 500 milliGRAM(s) two times a day  atorvastatin 20 milliGRAM(s) at bedtime  bisacodyl 10 milliGRAM(s) at bedtime PRN  erythromycin   Ointment 1 Application(s) two times a day  heparin   Injectable 5000 Unit(s) every 8 hours  lactulose Syrup 10 Gram(s) daily PRN  lidocaine   Patch 1 Patch daily  losartan 50 milliGRAM(s) daily  melatonin 3 milliGRAM(s) at bedtime PRN  metoprolol tartrate 12.5 milliGRAM(s) two times a day  multivitamin 1 Tablet(s) daily  ondansetron   Disintegrating Tablet 4 milliGRAM(s) every 6 hours PRN  pantoprazole    Tablet 40 milliGRAM(s) before breakfast  polyethylene glycol 3350 17 Gram(s) daily  senna 2 Tablet(s) at bedtime PRN  sodium chloride 1 Gram(s) daily      RECENT LABS/IMAGING - Ind Reviewed                      ---------  PHYSICAL EXAM  Constitutional - NAD, Comfortable  Pulm - Breathing comfortably, No wheezing  Abd - Soft   Extremities - No edema   Neurologic Exam -                    Cognitive - Awake, Alert  Psychiatric - Mood WNL     ASSESSMENT/PLAN  65y Male with functional deficits after a trauma  Cardiac - Losartan, Metoprolol  Pain - Tylenol    Discharge home today

## 2020-08-08 NOTE — PROGRESS NOTE ADULT - PROVIDER SPECIALTY LIST ADULT
Hospitalist
Physiatry
Rehab Medicine
Rehab Medicine
Hospitalist
Physiatry
Physiatry

## 2020-08-12 ENCOUNTER — APPOINTMENT (OUTPATIENT)
Dept: CT IMAGING | Facility: CLINIC | Age: 65
End: 2020-08-12
Payer: COMMERCIAL

## 2020-08-12 ENCOUNTER — OUTPATIENT (OUTPATIENT)
Dept: OUTPATIENT SERVICES | Facility: HOSPITAL | Age: 65
LOS: 1 days | End: 2020-08-12
Payer: COMMERCIAL

## 2020-08-12 DIAGNOSIS — Z90.49 ACQUIRED ABSENCE OF OTHER SPECIFIED PARTS OF DIGESTIVE TRACT: Chronic | ICD-10-CM

## 2020-08-12 DIAGNOSIS — S06.890D: ICD-10-CM

## 2020-08-12 DIAGNOSIS — I62.9 NONTRAUMATIC INTRACRANIAL HEMORRHAGE, UNSPECIFIED: ICD-10-CM

## 2020-08-12 PROCEDURE — 70450 CT HEAD/BRAIN W/O DYE: CPT

## 2020-08-12 PROCEDURE — 70450 CT HEAD/BRAIN W/O DYE: CPT | Mod: 26

## 2020-08-13 ENCOUNTER — APPOINTMENT (OUTPATIENT)
Dept: NEUROSURGERY | Facility: CLINIC | Age: 65
End: 2020-08-13
Payer: COMMERCIAL

## 2020-08-13 VITALS
OXYGEN SATURATION: 100 % | DIASTOLIC BLOOD PRESSURE: 78 MMHG | WEIGHT: 205 LBS | HEART RATE: 73 BPM | SYSTOLIC BLOOD PRESSURE: 136 MMHG | BODY MASS INDEX: 31.07 KG/M2 | HEIGHT: 68 IN

## 2020-08-13 DIAGNOSIS — S06.6X9D TRAUMATIC SUBARACHNOID HEMORRHAGE WITH LOSS OF CONSCIOUSNESS OF UNSPECIFIED DURATION, SUBSEQUENT ENCOUNTER: ICD-10-CM

## 2020-08-13 DIAGNOSIS — I61.5 NONTRAUMATIC INTRACEREBRAL HEMORRHAGE, INTRAVENTRICULAR: ICD-10-CM

## 2020-08-13 PROCEDURE — 99203 OFFICE O/P NEW LOW 30 MIN: CPT
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
## 2020-08-26 ENCOUNTER — APPOINTMENT (OUTPATIENT)
Dept: ORTHOPEDIC SURGERY | Facility: CLINIC | Age: 65
End: 2020-08-26
Payer: COMMERCIAL

## 2020-08-26 VITALS
HEART RATE: 66 BPM | BODY MASS INDEX: 31.07 KG/M2 | SYSTOLIC BLOOD PRESSURE: 137 MMHG | DIASTOLIC BLOOD PRESSURE: 66 MMHG | TEMPERATURE: 98.1 F | HEIGHT: 68 IN | WEIGHT: 205 LBS

## 2020-08-26 PROCEDURE — 99204 OFFICE O/P NEW MOD 45 MIN: CPT | Mod: 57

## 2020-08-26 PROCEDURE — 25600 CLTX DST RDL FX/EPHYS SEP WO: CPT | Mod: RT

## 2020-08-26 PROCEDURE — 73110 X-RAY EXAM OF WRIST: CPT | Mod: 50

## 2020-08-26 NOTE — CONSULT LETTER
[Consult Letter:] : I had the pleasure of evaluating your patient, [unfilled]. [Please see my note below.] : Please see my note below. [Consult Closing:] : Thank you very much for allowing me to participate in the care of this patient.  If you have any questions, please do not hesitate to contact me. [Sincerely,] : Sincerely, [FreeTextEntry3] : Dr. Ana Paula Robb\par Orthopaedic Surgery\par Hand & Upper Extremity.\par

## 2020-08-26 NOTE — ASSESSMENT
[FreeTextEntry1] : a 65-year-old male one month status post closed treatment of a nondisplaced right distal radius fracture. I recommend use of a wrist immobilizer for 2 weeks at which point he will follow up for repeat x-rays and reevaluation.

## 2020-08-26 NOTE — PHYSICAL EXAM
[Normal RUE] : Right Upper Extremity: No scars, rashes, lesions, ulcers, skin intact [Normal Finger/nose] : finger to nose coordination [Normal] : no peripheral adenopathy appreciated [de-identified] : right wrist:\par Skin intact no swelling no erythema no ecchymosis\par Range of motion of the digits intact, range of motion of the wrist slightly limited secondary to stiffness\par Minimal tenderness to palpation at the radial styloid, no tenderness in the snuffbox\par Neurovascularly intact distally [de-identified] : manar [de-identified] : 3 x-ray views of the bilateral wrists are reviewedthere is a nondisplaced fracture of the right radial styloid, no osseus abnormalities of the left wrist

## 2020-09-09 ENCOUNTER — APPOINTMENT (OUTPATIENT)
Dept: ORTHOPEDIC SURGERY | Facility: CLINIC | Age: 65
End: 2020-09-09
Payer: COMMERCIAL

## 2020-09-09 DIAGNOSIS — S52.571D OTHER INTRAARTICULAR FRACTURE OF LOWER END OF RIGHT RADIUS, SUBSEQUENT ENCOUNTER FOR CLOSED FRACTURE WITH ROUTINE HEALING: ICD-10-CM

## 2020-09-09 DIAGNOSIS — L98.0 PYOGENIC GRANULOMA: ICD-10-CM

## 2020-09-09 PROCEDURE — 11420 EXC H-F-NK-SP B9+MARG 0.5/<: CPT | Mod: 79,F3

## 2020-09-09 PROCEDURE — 99213 OFFICE O/P EST LOW 20 MIN: CPT | Mod: 24,25

## 2020-09-09 NOTE — HISTORY OF PRESENT ILLNESS
[FreeTextEntry1] : 8/26/20: 65-year-old male presents for followup of a right wrist injury. He had a syncopal episode approximately one month ago at which time he sustained a closed head injury as well as a nondisplaced right distal radius fracture. At the time of his hospitalization he was placed in a sugar tong splint that he has kept clean dry and intact.He no longer complains of pain in the wrist and has been able to move his fingers freely.\par \par 9/9/20: patient returns today approximately 7 weeks status post closed treatment of a nondisplaced right distal radius fracture. He has minimal pain at the fracture site and has been working on range of motion of his digits. Additionally he has enlargement of acutaneous mass at the tip of the ring finger which is consistent with a pyogenic granuloma. He is interested in excision of the mass today.

## 2020-09-09 NOTE — ASSESSMENT
[FreeTextEntry1] : 65-year-old male status post closed treatment of a right nondisplaced radial styloid fracture healing well.He may discontinue use of the wrist immobilizer and begin a course of occupational therapy for range of motion strengthening of the wrist. The cutaneous lesion consistent with pyogenic granuloma was excised from the right ring finger today in the office and the patient tolerated the excision procedure well. He will begin normal daily showers and dressing changes tomorrow followup in 2 weeks for a wound check.

## 2020-09-09 NOTE — PHYSICAL EXAM
[Normal RUE] : Right Upper Extremity: No scars, rashes, lesions, ulcers, skin intact [Normal Finger/nose] : finger to nose coordination [Normal] : no peripheral adenopathy appreciated [de-identified] : right wrist:\par Skin intact no swelling no erythema no ecchymosis, 0.3 x 0.3 reddish cutaneous lesion in the tip of the RF\par Range of motion of the digits intact, range of motion of the wrist slightly limited secondary to stiffness\par Minimal tenderness to palpation at the radial styloid, no tenderness in the snuffbox\par Neurovascularly intact distally [de-identified] : manar [de-identified] : 3 x-ray views of the bilateral wrists are reviewed there is a nondisplaced fracture of the right radial styloid with interval callus formation

## 2020-09-09 NOTE — PROCEDURE
[FreeTextEntry1] : right ring finger mass excision:\par Skin prepped with alcohol, digital block administered using 8 cc of 1% plain lidocaine to the right ring finger\par Digital tourniquet applied the skin prepped with chlorhexidine. 11 blade and Adson pickups used to excise the cutaneous skin lesion from the underlying soft tissue. Wound closed with 50 plain gut suture in interrupted fashion. Xeroform and gauze dressing applied. Digital tourniquet removed. Patient tolerated the procedure well.

## 2020-09-14 ENCOUNTER — APPOINTMENT (OUTPATIENT)
Dept: NEUROLOGY | Facility: CLINIC | Age: 65
End: 2020-09-14
Payer: COMMERCIAL

## 2020-09-14 VITALS
HEART RATE: 72 BPM | TEMPERATURE: 98 F | WEIGHT: 208 LBS | BODY MASS INDEX: 31.52 KG/M2 | DIASTOLIC BLOOD PRESSURE: 81 MMHG | HEIGHT: 68 IN | SYSTOLIC BLOOD PRESSURE: 137 MMHG

## 2020-09-14 DIAGNOSIS — I10 ESSENTIAL (PRIMARY) HYPERTENSION: ICD-10-CM

## 2020-09-14 DIAGNOSIS — Z82.49 FAMILY HISTORY OF ISCHEMIC HEART DISEASE AND OTHER DISEASES OF THE CIRCULATORY SYSTEM: ICD-10-CM

## 2020-09-14 DIAGNOSIS — Z82.0 FAMILY HISTORY OF EPILEPSY AND OTHER DISEASES OF THE NERVOUS SYSTEM: ICD-10-CM

## 2020-09-14 DIAGNOSIS — Z81.8 FAMILY HISTORY OF OTHER MENTAL AND BEHAVIORAL DISORDERS: ICD-10-CM

## 2020-09-14 DIAGNOSIS — G47.30 SLEEP APNEA, UNSPECIFIED: ICD-10-CM

## 2020-09-14 DIAGNOSIS — E78.00 PURE HYPERCHOLESTEROLEMIA, UNSPECIFIED: ICD-10-CM

## 2020-09-14 DIAGNOSIS — Z86.2 PERSONAL HISTORY OF DISEASES OF THE BLOOD AND BLOOD-FORMING ORGANS AND CERTAIN DISORDERS INVOLVING THE IMMUNE MECHANISM: ICD-10-CM

## 2020-09-14 DIAGNOSIS — Z87.820 PERSONAL HISTORY OF TRAUMATIC BRAIN INJURY: ICD-10-CM

## 2020-09-14 PROCEDURE — 99204 OFFICE O/P NEW MOD 45 MIN: CPT

## 2020-09-14 RX ORDER — LOSARTAN POTASSIUM 50 MG/1
50 TABLET, FILM COATED ORAL
Qty: 30 | Refills: 0 | Status: ACTIVE | COMMUNITY
Start: 2020-08-07

## 2020-09-14 RX ORDER — CLOPIDOGREL BISULFATE 75 MG/1
75 TABLET, FILM COATED ORAL
Qty: 30 | Refills: 0 | Status: DISCONTINUED | COMMUNITY
Start: 2017-05-01 | End: 2020-09-14

## 2020-09-14 RX ORDER — ASPIRIN 81 MG
81 TABLET, DELAYED RELEASE (ENTERIC COATED) ORAL
Refills: 0 | Status: ACTIVE | COMMUNITY

## 2020-09-14 RX ORDER — PNV NO.95/FERROUS FUM/FOLIC AC 28MG-0.8MG
TABLET ORAL
Refills: 0 | Status: ACTIVE | COMMUNITY

## 2020-09-14 RX ORDER — MULTIVIT-MIN/IRON/FOLIC ACID/K 18-600-40
CAPSULE ORAL
Refills: 0 | Status: ACTIVE | COMMUNITY

## 2020-09-14 RX ORDER — RANOLAZINE 500 MG/1
500 TABLET, FILM COATED, EXTENDED RELEASE ORAL
Qty: 60 | Refills: 0 | Status: DISCONTINUED | COMMUNITY
Start: 2017-05-01 | End: 2020-09-14
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
## 2020-09-14 NOTE — REASON FOR VISIT
[Follow-Up Visit] : a follow-up visit for [Consultation] : a consultation visit [Spouse] : spouse [FreeTextEntry1] : Referred by Dr. Najera for head trauma / bleed - follow up

## 2020-09-14 NOTE — PHYSICAL EXAM
[General Appearance - Alert] : alert [General Appearance - In No Acute Distress] : in no acute distress [Oriented To Time, Place, And Person] : oriented to person, place, and time [Mood] : the mood was normal [Cranial Nerves Optic (II)] : visual acuity intact bilaterally,  visual fields full to confrontation, pupils equal round and reactive to light [Cranial Nerves Oculomotor (III)] : extraocular motion intact [Cranial Nerves Trigeminal (V)] : facial sensation intact symmetrically [Cranial Nerves Facial (VII)] : face symmetrical [Cranial Nerves Vestibulocochlear (VIII)] : hearing was intact bilaterally [Cranial Nerves Glossopharyngeal (IX)] : tongue and palate midline [Cranial Nerves Accessory (XI - Cranial And Spinal)] : head turning and shoulder shrug symmetric [Cranial Nerves Hypoglossal (XII)] : there was no tongue deviation with protrusion [Motor Tone] : muscle tone was normal in all four extremities [Motor Strength] : muscle strength was normal in all four extremities [Sensation Tactile Decrease] : light touch was intact [2+] : Brachioradialis left 2+ [0] : Ankle jerk right 0 [1+] : Ankle jerk left 1+ [PERRL With Normal Accommodation] : pupils were equal in size, round, reactive to light, with normal accommodation [Extraocular Movements] : extraocular movements were intact [Full Visual Field] : full visual field [Hearing Threshold Finger Rub Not [place]] : hearing was normal [Auscultation Breath Sounds / Voice Sounds] : lungs were clear to auscultation bilaterally [Heart Sounds] : normal S1 and S2 [Abnormal Walk] : normal gait [Involuntary Movements] : no involuntary movements were seen [] : no rash [Coordination - Dysmetria Impaired Finger-to-Nose Bilateral] : not present [FreeTextEntry8] : Gait/ balance: Broad- based / unable to walk tandem

## 2020-09-14 NOTE — REVIEW OF SYSTEMS
[Feeling Tired] : feeling tired [Memory Lapses or Loss] : memory loss [Decr. Concentrating Ability] : decreased concentrating ability [Poor Coordination] : poor coordination [Dizziness] : dizziness [Lightheadedness] : lightheadedness [As Noted in HPI] : as noted in HPI [Negative] : Heme/Lymph

## 2020-09-14 NOTE — PHYSICAL EXAM
[General Appearance - Alert] : alert [General Appearance - In No Acute Distress] : in no acute distress [Oriented To Time, Place, And Person] : oriented to person, place, and time [Mood] : the mood was normal [Cranial Nerves Optic (II)] : visual acuity intact bilaterally,  visual fields full to confrontation, pupils equal round and reactive to light [Cranial Nerves Oculomotor (III)] : extraocular motion intact [Cranial Nerves Trigeminal (V)] : facial sensation intact symmetrically [Cranial Nerves Facial (VII)] : face symmetrical [Cranial Nerves Vestibulocochlear (VIII)] : hearing was intact bilaterally [Cranial Nerves Glossopharyngeal (IX)] : tongue and palate midline [Cranial Nerves Accessory (XI - Cranial And Spinal)] : head turning and shoulder shrug symmetric [Cranial Nerves Hypoglossal (XII)] : there was no tongue deviation with protrusion [Motor Tone] : muscle tone was normal in all four extremities [Motor Strength] : muscle strength was normal in all four extremities [Sensation Tactile Decrease] : light touch was intact [2+] : Brachioradialis left 2+ [0] : Ankle jerk right 0 [1+] : Ankle jerk left 1+ [PERRL With Normal Accommodation] : pupils were equal in size, round, reactive to light, with normal accommodation [Extraocular Movements] : extraocular movements were intact [Full Visual Field] : full visual field [Hearing Threshold Finger Rub Not [place]] : hearing was normal [Auscultation Breath Sounds / Voice Sounds] : lungs were clear to auscultation bilaterally [Heart Sounds] : normal S1 and S2 [Abnormal Walk] : normal gait [Involuntary Movements] : no involuntary movements were seen [] : no rash [Coordination - Dysmetria Impaired Finger-to-Nose Bilateral] : not present [FreeTextEntry8] : Gait/ balance: Broad- based / unable to walk tandem
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
## 2020-09-23 ENCOUNTER — APPOINTMENT (OUTPATIENT)
Dept: ORTHOPEDIC SURGERY | Facility: CLINIC | Age: 65
End: 2020-09-23

## 2020-10-22 ENCOUNTER — APPOINTMENT (OUTPATIENT)
Dept: CT IMAGING | Facility: CLINIC | Age: 65
End: 2020-10-22
Payer: COMMERCIAL

## 2020-10-22 ENCOUNTER — OUTPATIENT (OUTPATIENT)
Dept: OUTPATIENT SERVICES | Facility: HOSPITAL | Age: 65
LOS: 1 days | End: 2020-10-22
Payer: COMMERCIAL

## 2020-10-22 DIAGNOSIS — Z00.8 ENCOUNTER FOR OTHER GENERAL EXAMINATION: ICD-10-CM

## 2020-10-22 DIAGNOSIS — Z90.49 ACQUIRED ABSENCE OF OTHER SPECIFIED PARTS OF DIGESTIVE TRACT: Chronic | ICD-10-CM

## 2020-10-22 DIAGNOSIS — R91.1 SOLITARY PULMONARY NODULE: ICD-10-CM

## 2020-10-22 PROCEDURE — 71250 CT THORAX DX C-: CPT

## 2020-10-22 PROCEDURE — 71250 CT THORAX DX C-: CPT | Mod: 26

## 2020-11-11 ENCOUNTER — APPOINTMENT (OUTPATIENT)
Dept: NEUROLOGY | Facility: CLINIC | Age: 65
End: 2020-11-11
Payer: COMMERCIAL

## 2020-11-11 VITALS
BODY MASS INDEX: 31.52 KG/M2 | HEIGHT: 68 IN | SYSTOLIC BLOOD PRESSURE: 128 MMHG | TEMPERATURE: 97.5 F | DIASTOLIC BLOOD PRESSURE: 80 MMHG | WEIGHT: 208 LBS | HEART RATE: 74 BPM

## 2020-11-11 DIAGNOSIS — R26.81 UNSTEADINESS ON FEET: ICD-10-CM

## 2020-11-11 DIAGNOSIS — S06.339A CONTUSION AND LACERATION OF CEREBRUM, UNSPECIFIED, WITH LOSS OF CONSCIOUSNESS OF UNSPECIFIED DURATION, INITIAL ENCOUNTER: ICD-10-CM

## 2020-11-11 PROCEDURE — 99072 ADDL SUPL MATRL&STAF TM PHE: CPT

## 2020-11-11 PROCEDURE — 99214 OFFICE O/P EST MOD 30 MIN: CPT

## 2020-11-11 NOTE — PHYSICAL EXAM
[General Appearance - Alert] : alert [General Appearance - In No Acute Distress] : in no acute distress [Oriented To Time, Place, And Person] : oriented to person, place, and time [Mood] : the mood was normal [Cranial Nerves Optic (II)] : visual acuity intact bilaterally,  visual fields full to confrontation, pupils equal round and reactive to light [Cranial Nerves Oculomotor (III)] : extraocular motion intact [Cranial Nerves Trigeminal (V)] : facial sensation intact symmetrically [Cranial Nerves Facial (VII)] : face symmetrical [Cranial Nerves Vestibulocochlear (VIII)] : hearing was intact bilaterally [Cranial Nerves Glossopharyngeal (IX)] : tongue and palate midline [Cranial Nerves Accessory (XI - Cranial And Spinal)] : head turning and shoulder shrug symmetric [Cranial Nerves Hypoglossal (XII)] : there was no tongue deviation with protrusion [Motor Tone] : muscle tone was normal in all four extremities [Motor Strength] : muscle strength was normal in all four extremities [Sensation Tactile Decrease] : light touch was intact [PERRL With Normal Accommodation] : pupils were equal in size, round, reactive to light, with normal accommodation [Extraocular Movements] : extraocular movements were intact [Full Visual Field] : full visual field [Hearing Threshold Finger Rub Not [place]] : hearing was normal [] : no respiratory distress [Auscultation Breath Sounds / Voice Sounds] : lungs were clear to auscultation bilaterally [Heart Sounds] : normal S1 and S2 [Person] : oriented to person [Place] : oriented to place [Time] : oriented to time [Registration Intact] : recent registration memory intact [Concentration Intact] : normal concentrating ability [Naming Objects] : no difficulty naming common objects [Repeating Phrases] : no difficulty repeating a phrase [Fluency] : fluency intact [Comprehension] : comprehension intact [Past History] : adequate knowledge of personal past history [Abnormal Walk] : normal gait [Past-pointing] : there was no past-pointing [Tremor] : no tremor present [Coordination - Dysmetria Impaired Finger-to-Nose Bilateral] : not present [2+] : Ankle jerk left 2+ [Plantar Reflex Right Only] : normal on the right [Plantar Reflex Left Only] : normal on the left [FreeTextEntry8] : Able to walk tandem

## 2020-11-11 NOTE — HISTORY OF PRESENT ILLNESS
[FreeTextEntry1] : Patient is here for a followup visit today, last seen on 9/14/20. Pt has noted total resolution of dizziness, he walks with a stable gait, he denies lightheadedness, double vision, nausea, vomiting. He reports he sleeps well, has not had difficulty concentrating, focusing or with attention.  Pt attended PT for balance and gait training for 3 weeks\par \par His wife who is accompanying him feels he is back to his baseline, he has followed up with his ophthalmologist, has been cleared.
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
## 2020-12-11 ENCOUNTER — APPOINTMENT (OUTPATIENT)
Dept: RHEUMATOLOGY | Facility: CLINIC | Age: 65
End: 2020-12-11
Payer: COMMERCIAL

## 2020-12-11 VITALS
TEMPERATURE: 98.1 F | OXYGEN SATURATION: 98 % | HEIGHT: 68 IN | DIASTOLIC BLOOD PRESSURE: 80 MMHG | HEART RATE: 73 BPM | WEIGHT: 216 LBS | SYSTOLIC BLOOD PRESSURE: 120 MMHG | BODY MASS INDEX: 32.74 KG/M2

## 2020-12-11 DIAGNOSIS — M10.9 GOUT, UNSPECIFIED: ICD-10-CM

## 2020-12-11 PROCEDURE — 99213 OFFICE O/P EST LOW 20 MIN: CPT

## 2020-12-11 PROCEDURE — 99072 ADDL SUPL MATRL&STAF TM PHE: CPT

## 2020-12-12 NOTE — QUALITY MEASURES
[Most recent serum uric acid <6.8 mg/dL] : patient's most recent serum uric acid level was <6.8 mg/dL
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
## 2020-12-12 NOTE — ASSESSMENT
[FreeTextEntry1] : 65-year-old  male with a history of coronary artery disease on Plavix, presumed gout, history of kidney stones presents for routine visit today. He returns after one year today. He has been doing reasonably well for this past year.\par \par Gout-\par He is on allopurinol 300 mg daily. There has been no gout flare at least for the past 4 years. He is to continue with the medication. He is to obtain labs for me today including a uric acid and a chemistry panel.\par \par Kidney stones-\par He has a remote history of kidney stones and another bout about a year ago. He has not had any episodes of kidney stones in the past year.\par \par Stressed importance of being compliant with medication use and continuing with the allopurinol to prevent a gout flare. He is aware to call the office if his gout flares. Followup one year unless there is any issues or change in his health that he needs to notify us about.

## 2020-12-12 NOTE — PHYSICAL EXAM
[Sclera] : the sclera and conjunctiva were normal [PERRL With Normal Accommodation] : pupils were equal in size, round, and reactive to light [Extraocular Movements] : extraocular movements were intact [Neck Appearance] : the appearance of the neck was normal [Neck Cervical Mass (___cm)] : no neck mass was observed [Jugular Venous Distention Increased] : there was no jugular-venous distention [Thyroid Diffuse Enlargement] : the thyroid was not enlarged [Thyroid Nodule] : there were no palpable thyroid nodules [Full Pulse] : the pedal pulses are present [Edema] : there was no peripheral edema [Bowel Sounds] : normal bowel sounds [Abdomen Soft] : soft [Abdomen Tenderness] : non-tender [] : no hepato-splenomegaly [Abdomen Mass (___ Cm)] : no abdominal mass palpated [Cervical Lymph Nodes Enlarged Posterior Bilaterally] : posterior cervical [Cervical Lymph Nodes Enlarged Anterior Bilaterally] : anterior cervical [Supraclavicular Lymph Nodes Enlarged Bilaterally] : supraclavicular [Axillary Lymph Nodes Enlarged Bilaterally] : axillary [No CVA Tenderness] : no ~M costovertebral angle tenderness [No Spinal Tenderness] : no spinal tenderness [Abnormal Walk] : normal gait [Musculoskeletal - Swelling] : no joint swelling seen [Motor Tone] : muscle strength and tone were normal [Deep Tendon Reflexes (DTR)] : deep tendon reflexes were 2+ and symmetric [Sensation] : the sensory exam was normal to light touch and pinprick [No Focal Deficits] : no focal deficits [Oriented To Time, Place, And Person] : oriented to person, place, and time [Impaired Insight] : insight and judgment were intact [Affect] : the affect was normal [FreeTextEntry1] : No psoriasis, subcutaneous nodules, tophi or other abnormal skin findings.

## 2020-12-28 ENCOUNTER — APPOINTMENT (OUTPATIENT)
Dept: NEUROLOGY | Facility: CLINIC | Age: 65
End: 2020-12-28
Payer: COMMERCIAL

## 2020-12-28 PROCEDURE — 99072 ADDL SUPL MATRL&STAF TM PHE: CPT

## 2020-12-28 PROCEDURE — 95806 SLEEP STUDY UNATT&RESP EFFT: CPT

## 2020-12-29 ENCOUNTER — LABORATORY RESULT (OUTPATIENT)
Age: 65
End: 2020-12-29

## 2020-12-30 ENCOUNTER — NON-APPOINTMENT (OUTPATIENT)
Age: 65
End: 2020-12-30

## 2020-12-30 LAB
ALBUMIN SERPL ELPH-MCNC: 4.6 G/DL
ALP BLD-CCNC: 76 U/L
ALT SERPL-CCNC: 26 U/L
ANION GAP SERPL CALC-SCNC: 13 MMOL/L
AST SERPL-CCNC: 24 U/L
BASOPHILS # BLD AUTO: 0.03 K/UL
BASOPHILS NFR BLD AUTO: 0.4 %
BILIRUB SERPL-MCNC: 2.4 MG/DL
BUN SERPL-MCNC: 14 MG/DL
CALCIUM SERPL-MCNC: 9.1 MG/DL
CHLORIDE SERPL-SCNC: 105 MMOL/L
CO2 SERPL-SCNC: 25 MMOL/L
CREAT SERPL-MCNC: 0.93 MG/DL
EOSINOPHIL # BLD AUTO: 0.36 K/UL
EOSINOPHIL NFR BLD AUTO: 4.8 %
GLUCOSE SERPL-MCNC: 79 MG/DL
HCT VFR BLD CALC: 43.1 %
HGB BLD-MCNC: 13 G/DL
IMM GRANULOCYTES NFR BLD AUTO: 0.4 %
LYMPHOCYTES # BLD AUTO: 1.26 K/UL
LYMPHOCYTES NFR BLD AUTO: 16.6 %
MAN DIFF?: NORMAL
MCHC RBC-ENTMCNC: 20.1 PG
MCHC RBC-ENTMCNC: 30.2 GM/DL
MCV RBC AUTO: 66.5 FL
MONOCYTES # BLD AUTO: 0.58 K/UL
MONOCYTES NFR BLD AUTO: 7.7 %
NEUTROPHILS # BLD AUTO: 5.31 K/UL
NEUTROPHILS NFR BLD AUTO: 70.1 %
PLATELET # BLD AUTO: 191 K/UL
POTASSIUM SERPL-SCNC: 4.1 MMOL/L
PROT SERPL-MCNC: 7.4 G/DL
RBC # BLD: 6.48 M/UL
RBC # FLD: 18.8 %
SODIUM SERPL-SCNC: 143 MMOL/L
URATE SERPL-MCNC: 3.2 MG/DL
WBC # FLD AUTO: 7.57 K/UL

## 2021-02-10 NOTE — ASU PATIENT PROFILE, ADULT - ABILITY TO HEAR (WITH HEARING AID OR HEARING APPLIANCE IF NORMALLY USED):
Adequate: hears normal conversation without difficulty Alternatives Discussed Intro (Do Not Add Period): I discussed alternative treatments to Mohs surgery and specifically discussed the risks and benefits of

## 2021-04-21 ENCOUNTER — OUTPATIENT (OUTPATIENT)
Dept: OUTPATIENT SERVICES | Facility: HOSPITAL | Age: 66
LOS: 1 days | Discharge: HOME | End: 2021-04-21

## 2021-04-21 VITALS
HEIGHT: 68 IN | DIASTOLIC BLOOD PRESSURE: 83 MMHG | WEIGHT: 212.08 LBS | HEART RATE: 61 BPM | SYSTOLIC BLOOD PRESSURE: 142 MMHG | OXYGEN SATURATION: 100 % | RESPIRATION RATE: 23 BRPM | TEMPERATURE: 97 F

## 2021-04-21 DIAGNOSIS — Z95.5 PRESENCE OF CORONARY ANGIOPLASTY IMPLANT AND GRAFT: Chronic | ICD-10-CM

## 2021-04-21 DIAGNOSIS — G47.33 OBSTRUCTIVE SLEEP APNEA (ADULT) (PEDIATRIC): ICD-10-CM

## 2021-04-21 DIAGNOSIS — Z99.89 DEPENDENCE ON OTHER ENABLING MACHINES AND DEVICES: ICD-10-CM

## 2021-04-21 DIAGNOSIS — Z95.5 PRESENCE OF CORONARY ANGIOPLASTY IMPLANT AND GRAFT: ICD-10-CM

## 2021-04-21 DIAGNOSIS — Z12.11 ENCOUNTER FOR SCREENING FOR MALIGNANT NEOPLASM OF COLON: ICD-10-CM

## 2021-04-21 DIAGNOSIS — I51.9 HEART DISEASE, UNSPECIFIED: ICD-10-CM

## 2021-04-21 DIAGNOSIS — M10.9 GOUT, UNSPECIFIED: ICD-10-CM

## 2021-04-21 DIAGNOSIS — I25.10 ATHEROSCLEROTIC HEART DISEASE OF NATIVE CORONARY ARTERY WITHOUT ANGINA PECTORIS: ICD-10-CM

## 2021-04-21 DIAGNOSIS — Z90.49 ACQUIRED ABSENCE OF OTHER SPECIFIED PARTS OF DIGESTIVE TRACT: Chronic | ICD-10-CM

## 2021-04-21 DIAGNOSIS — Z79.82 LONG TERM (CURRENT) USE OF ASPIRIN: ICD-10-CM

## 2021-04-21 DIAGNOSIS — Z90.49 ACQUIRED ABSENCE OF OTHER SPECIFIED PARTS OF DIGESTIVE TRACT: ICD-10-CM

## 2021-04-21 RX ORDER — ALLOPURINOL 300 MG
1 TABLET ORAL
Qty: 0 | Refills: 0 | DISCHARGE

## 2021-04-21 RX ORDER — FENOFIBRATE,MICRONIZED 130 MG
1 CAPSULE ORAL
Qty: 0 | Refills: 0 | DISCHARGE

## 2021-04-21 RX ORDER — ASPIRIN/CALCIUM CARB/MAGNESIUM 324 MG
1 TABLET ORAL
Qty: 0 | Refills: 0 | DISCHARGE

## 2021-04-21 RX ORDER — ATORVASTATIN CALCIUM 80 MG/1
1 TABLET, FILM COATED ORAL
Qty: 0 | Refills: 0 | DISCHARGE

## 2021-04-21 RX ORDER — METOPROLOL TARTRATE 50 MG
1 TABLET ORAL
Qty: 0 | Refills: 0 | DISCHARGE
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
## 2021-05-11 ENCOUNTER — APPOINTMENT (OUTPATIENT)
Dept: DISASTER EMERGENCY | Facility: OTHER | Age: 66
End: 2021-05-11
Payer: COMMERCIAL

## 2021-05-11 PROCEDURE — 0012A: CPT

## 2021-12-10 ENCOUNTER — APPOINTMENT (OUTPATIENT)
Dept: RHEUMATOLOGY | Facility: CLINIC | Age: 66
End: 2021-12-10

## 2022-10-10 ENCOUNTER — EMERGENCY (EMERGENCY)
Facility: HOSPITAL | Age: 67
LOS: 0 days | Discharge: HOME | End: 2022-10-10
Attending: EMERGENCY MEDICINE
Payer: COMMERCIAL

## 2022-10-10 VITALS — WEIGHT: 229.94 LBS | HEIGHT: 68 IN

## 2022-10-10 VITALS
TEMPERATURE: 98 F | OXYGEN SATURATION: 100 % | HEART RATE: 57 BPM | RESPIRATION RATE: 17 BRPM | SYSTOLIC BLOOD PRESSURE: 146 MMHG | DIASTOLIC BLOOD PRESSURE: 70 MMHG

## 2022-10-10 DIAGNOSIS — Z90.49 ACQUIRED ABSENCE OF OTHER SPECIFIED PARTS OF DIGESTIVE TRACT: ICD-10-CM

## 2022-10-10 DIAGNOSIS — G47.30 SLEEP APNEA, UNSPECIFIED: ICD-10-CM

## 2022-10-10 DIAGNOSIS — R10.12 LEFT UPPER QUADRANT PAIN: ICD-10-CM

## 2022-10-10 DIAGNOSIS — I10 ESSENTIAL (PRIMARY) HYPERTENSION: ICD-10-CM

## 2022-10-10 DIAGNOSIS — M10.9 GOUT, UNSPECIFIED: ICD-10-CM

## 2022-10-10 DIAGNOSIS — E78.5 HYPERLIPIDEMIA, UNSPECIFIED: ICD-10-CM

## 2022-10-10 DIAGNOSIS — Z95.5 PRESENCE OF CORONARY ANGIOPLASTY IMPLANT AND GRAFT: ICD-10-CM

## 2022-10-10 DIAGNOSIS — D56.9 THALASSEMIA, UNSPECIFIED: ICD-10-CM

## 2022-10-10 DIAGNOSIS — Z90.49 ACQUIRED ABSENCE OF OTHER SPECIFIED PARTS OF DIGESTIVE TRACT: Chronic | ICD-10-CM

## 2022-10-10 DIAGNOSIS — N20.0 CALCULUS OF KIDNEY: ICD-10-CM

## 2022-10-10 DIAGNOSIS — Z79.82 LONG TERM (CURRENT) USE OF ASPIRIN: ICD-10-CM

## 2022-10-10 DIAGNOSIS — J44.9 CHRONIC OBSTRUCTIVE PULMONARY DISEASE, UNSPECIFIED: ICD-10-CM

## 2022-10-10 DIAGNOSIS — G47.33 OBSTRUCTIVE SLEEP APNEA (ADULT) (PEDIATRIC): ICD-10-CM

## 2022-10-10 DIAGNOSIS — Z95.5 PRESENCE OF CORONARY ANGIOPLASTY IMPLANT AND GRAFT: Chronic | ICD-10-CM

## 2022-10-10 PROBLEM — S06.9X9A UNSPECIFIED INTRACRANIAL INJURY WITH LOSS OF CONSCIOUSNESS OF UNSPECIFIED DURATION, INITIAL ENCOUNTER: Chronic | Status: ACTIVE | Noted: 2021-04-21

## 2022-10-10 LAB
ALBUMIN SERPL ELPH-MCNC: 4.2 G/DL — SIGNIFICANT CHANGE UP (ref 3.3–5)
ALP SERPL-CCNC: 67 U/L — SIGNIFICANT CHANGE UP (ref 40–120)
ALT FLD-CCNC: 46 U/L — SIGNIFICANT CHANGE UP (ref 12–78)
ANION GAP SERPL CALC-SCNC: 5 MMOL/L — SIGNIFICANT CHANGE UP (ref 5–17)
APPEARANCE UR: CLEAR — SIGNIFICANT CHANGE UP
AST SERPL-CCNC: 27 U/L — SIGNIFICANT CHANGE UP (ref 15–37)
BASOPHILS # BLD AUTO: 0.02 K/UL — SIGNIFICANT CHANGE UP (ref 0–0.2)
BASOPHILS NFR BLD AUTO: 0.2 % — SIGNIFICANT CHANGE UP (ref 0–2)
BILIRUB SERPL-MCNC: 1.6 MG/DL — HIGH (ref 0.2–1.2)
BILIRUB UR-MCNC: NEGATIVE — SIGNIFICANT CHANGE UP
BUN SERPL-MCNC: 17 MG/DL — SIGNIFICANT CHANGE UP (ref 7–23)
CALCIUM SERPL-MCNC: 9.3 MG/DL — SIGNIFICANT CHANGE UP (ref 8.5–10.1)
CHLORIDE SERPL-SCNC: 108 MMOL/L — SIGNIFICANT CHANGE UP (ref 96–108)
CO2 SERPL-SCNC: 27 MMOL/L — SIGNIFICANT CHANGE UP (ref 22–31)
COLOR SPEC: YELLOW — SIGNIFICANT CHANGE UP
CREAT SERPL-MCNC: 1.18 MG/DL — SIGNIFICANT CHANGE UP (ref 0.5–1.3)
DIFF PNL FLD: ABNORMAL
EGFR: 68 ML/MIN/1.73M2 — SIGNIFICANT CHANGE UP
EOSINOPHIL # BLD AUTO: 0.18 K/UL — SIGNIFICANT CHANGE UP (ref 0–0.5)
EOSINOPHIL NFR BLD AUTO: 1.9 % — SIGNIFICANT CHANGE UP (ref 0–6)
GLUCOSE SERPL-MCNC: 104 MG/DL — HIGH (ref 70–99)
GLUCOSE UR QL: NEGATIVE — SIGNIFICANT CHANGE UP
HCT VFR BLD CALC: 42.4 % — SIGNIFICANT CHANGE UP (ref 39–50)
HGB BLD-MCNC: 13.1 G/DL — SIGNIFICANT CHANGE UP (ref 13–17)
IMM GRANULOCYTES NFR BLD AUTO: 0.6 % — SIGNIFICANT CHANGE UP (ref 0–0.9)
KETONES UR-MCNC: NEGATIVE — SIGNIFICANT CHANGE UP
LEUKOCYTE ESTERASE UR-ACNC: ABNORMAL
LYMPHOCYTES # BLD AUTO: 0.97 K/UL — LOW (ref 1–3.3)
LYMPHOCYTES # BLD AUTO: 10 % — LOW (ref 13–44)
MCHC RBC-ENTMCNC: 20.5 PG — LOW (ref 27–34)
MCHC RBC-ENTMCNC: 30.9 GM/DL — LOW (ref 32–36)
MCV RBC AUTO: 66.4 FL — LOW (ref 80–100)
MONOCYTES # BLD AUTO: 0.73 K/UL — SIGNIFICANT CHANGE UP (ref 0–0.9)
MONOCYTES NFR BLD AUTO: 7.5 % — SIGNIFICANT CHANGE UP (ref 2–14)
NEUTROPHILS # BLD AUTO: 7.76 K/UL — HIGH (ref 1.8–7.4)
NEUTROPHILS NFR BLD AUTO: 79.8 % — HIGH (ref 43–77)
NITRITE UR-MCNC: NEGATIVE — SIGNIFICANT CHANGE UP
PH UR: 5 — SIGNIFICANT CHANGE UP (ref 5–8)
PLATELET # BLD AUTO: 232 K/UL — SIGNIFICANT CHANGE UP (ref 150–400)
POTASSIUM SERPL-MCNC: 4.4 MMOL/L — SIGNIFICANT CHANGE UP (ref 3.5–5.3)
POTASSIUM SERPL-SCNC: 4.4 MMOL/L — SIGNIFICANT CHANGE UP (ref 3.5–5.3)
PROT SERPL-MCNC: 7.8 GM/DL — SIGNIFICANT CHANGE UP (ref 6–8.3)
PROT UR-MCNC: NEGATIVE — SIGNIFICANT CHANGE UP
RBC # BLD: 6.39 M/UL — HIGH (ref 4.2–5.8)
RBC # FLD: 17.2 % — HIGH (ref 10.3–14.5)
SODIUM SERPL-SCNC: 140 MMOL/L — SIGNIFICANT CHANGE UP (ref 135–145)
SP GR SPEC: 1.02 — SIGNIFICANT CHANGE UP (ref 1.01–1.02)
UROBILINOGEN FLD QL: NEGATIVE — SIGNIFICANT CHANGE UP
WBC # BLD: 9.72 K/UL — SIGNIFICANT CHANGE UP (ref 3.8–10.5)
WBC # FLD AUTO: 9.72 K/UL — SIGNIFICANT CHANGE UP (ref 3.8–10.5)

## 2022-10-10 PROCEDURE — 74176 CT ABD & PELVIS W/O CONTRAST: CPT | Mod: ME

## 2022-10-10 PROCEDURE — 96374 THER/PROPH/DIAG INJ IV PUSH: CPT

## 2022-10-10 PROCEDURE — G1004: CPT

## 2022-10-10 PROCEDURE — 99285 EMERGENCY DEPT VISIT HI MDM: CPT

## 2022-10-10 PROCEDURE — 36415 COLL VENOUS BLD VENIPUNCTURE: CPT

## 2022-10-10 PROCEDURE — 99284 EMERGENCY DEPT VISIT MOD MDM: CPT | Mod: 25

## 2022-10-10 PROCEDURE — 80053 COMPREHEN METABOLIC PANEL: CPT

## 2022-10-10 PROCEDURE — 87086 URINE CULTURE/COLONY COUNT: CPT

## 2022-10-10 PROCEDURE — 81001 URINALYSIS AUTO W/SCOPE: CPT

## 2022-10-10 PROCEDURE — 85025 COMPLETE CBC W/AUTO DIFF WBC: CPT

## 2022-10-10 PROCEDURE — 74176 CT ABD & PELVIS W/O CONTRAST: CPT | Mod: 26,ME

## 2022-10-10 RX ORDER — SODIUM CHLORIDE 9 MG/ML
1000 INJECTION INTRAMUSCULAR; INTRAVENOUS; SUBCUTANEOUS ONCE
Refills: 0 | Status: COMPLETED | OUTPATIENT
Start: 2022-10-10 | End: 2022-10-10

## 2022-10-10 RX ORDER — OXYCODONE HYDROCHLORIDE 5 MG/1
1 TABLET ORAL
Qty: 9 | Refills: 0
Start: 2022-10-10

## 2022-10-10 RX ORDER — MORPHINE SULFATE 50 MG/1
4 CAPSULE, EXTENDED RELEASE ORAL ONCE
Refills: 0 | Status: DISCONTINUED | OUTPATIENT
Start: 2022-10-10 | End: 2022-10-10

## 2022-10-10 RX ADMIN — MORPHINE SULFATE 4 MILLIGRAM(S): 50 CAPSULE, EXTENDED RELEASE ORAL at 13:47

## 2022-10-10 RX ADMIN — SODIUM CHLORIDE 1000 MILLILITER(S): 9 INJECTION INTRAMUSCULAR; INTRAVENOUS; SUBCUTANEOUS at 13:19

## 2022-10-10 NOTE — ED STATDOCS - NSICDXPASTSURGICALHX_GEN_ALL_CORE_FT
PAST SURGICAL HISTORY:  Coronary stent patent placement    H/O colectomy     S/P cholecystectomy

## 2022-10-10 NOTE — ED STATDOCS - PROGRESS NOTE DETAILS
Patient seen and evaluated.  Pain controlled.  +kidney stone.  Patient to follow up urology, has appt with Dr. Mckinley in 3 days.  symptom management and return precautions reviewed -Jose A Reyes PA-C

## 2022-10-10 NOTE — ED ADULT NURSE NOTE - OBJECTIVE STATEMENT
Pt presents to ER c/o left flank pain and painful urination/hematuria. Onset of symptoms began one week PTA and became exacerbated yesterday. On daily aspirin. AO x 3

## 2022-10-10 NOTE — ED STATDOCS - ATTENDING APP SHARED VISIT CONTRIBUTION OF CARE
I personally saw the patient with the NISHI, and completed the key components of the history and physical exam. I then discussed the management plan with the NISHI.
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
## 2022-10-10 NOTE — ED STATDOCS - NS ED ATTENDING STATEMENT MOD
This was a shared visit with the NISHI. I reviewed and verified the documentation and independently performed the documented:

## 2022-10-10 NOTE — ED STATDOCS - CLINICAL SUMMARY MEDICAL DECISION MAKING FREE TEXT BOX
Pt w/ initially painless hematuria now w/ left sided flank and abd pain, likely kidney stone. Will check labs, CT, and reassess.

## 2022-10-10 NOTE — ED STATDOCS - OBJECTIVE STATEMENT
66 y/o male w/ a PMHx of HTN, HLD, thalassemia, diverticulitis, sleep apnea, gout, COPD, and cardiac stents presents to the ED sent in by Dr. Najera to r/o kidney stones c/o left sided flank pain when urinating. Pt states he saw Dr. Najera for hematuria, states that has resolved. Denies hx of kidney stones. Pt on baby ASA.

## 2022-10-11 LAB
CULTURE RESULTS: SIGNIFICANT CHANGE UP
SPECIMEN SOURCE: SIGNIFICANT CHANGE UP

## 2022-10-13 ENCOUNTER — OUTPATIENT (OUTPATIENT)
Dept: OUTPATIENT SERVICES | Facility: HOSPITAL | Age: 67
LOS: 1 days | End: 2022-10-13
Payer: COMMERCIAL

## 2022-10-13 ENCOUNTER — APPOINTMENT (OUTPATIENT)
Dept: RADIOLOGY | Facility: CLINIC | Age: 67
End: 2022-10-13

## 2022-10-13 ENCOUNTER — APPOINTMENT (OUTPATIENT)
Dept: UROLOGY | Facility: CLINIC | Age: 67
End: 2022-10-13

## 2022-10-13 VITALS
OXYGEN SATURATION: 99 % | WEIGHT: 220 LBS | BODY MASS INDEX: 33.34 KG/M2 | SYSTOLIC BLOOD PRESSURE: 147 MMHG | HEIGHT: 68 IN | HEART RATE: 66 BPM | DIASTOLIC BLOOD PRESSURE: 84 MMHG

## 2022-10-13 DIAGNOSIS — Z95.5 PRESENCE OF CORONARY ANGIOPLASTY IMPLANT AND GRAFT: Chronic | ICD-10-CM

## 2022-10-13 DIAGNOSIS — Z90.49 ACQUIRED ABSENCE OF OTHER SPECIFIED PARTS OF DIGESTIVE TRACT: Chronic | ICD-10-CM

## 2022-10-13 DIAGNOSIS — N40.1 BENIGN PROSTATIC HYPERPLASIA WITH LOWER URINARY TRACT SYMPTOMS: ICD-10-CM

## 2022-10-13 PROCEDURE — 74018 RADEX ABDOMEN 1 VIEW: CPT | Mod: 26

## 2022-10-13 PROCEDURE — 74018 RADEX ABDOMEN 1 VIEW: CPT

## 2022-10-13 PROCEDURE — 99204 OFFICE O/P NEW MOD 45 MIN: CPT

## 2022-10-14 LAB — PSA SERPL-MCNC: 1.18 NG/ML

## 2022-10-14 NOTE — REVIEW OF SYSTEMS
[Feeling Tired] : feeling tired [Vomiting] : vomiting [Seen by urologist before (Name)  ___] : Preciously seen by a urologist: [unfilled] [Blood in urine that you can see] : blood visible in urine [Told you have blood in urine on a urine test] : told blood was present in a urine test [History of kidney stones] : history of kidney stones [Wake up at night to urinate  How many times?  ___] : wakes up to urinate [unfilled] times during the night [Interrupted urine stream] : interrupted urine stream [see HPI] : see HPI [Negative] : Gastrointestinal [FreeTextEntry2] : nausea

## 2022-10-14 NOTE — HISTORY OF PRESENT ILLNESS
[FreeTextEntry1] : 67M presents today with a CC of a recent renal cholic and a history of urinary frequency. Pt had some flank pain and had a CT scan showing a distal ureteral stone. Several other stones were noted dependently in the bladder. He has a history of lower tract symptoms.

## 2022-10-14 NOTE — END OF VISIT
[FreeTextEntry3] : A KUB x-ray is ordered to see if the stone is present. He had a review of stone prevention measures including increased hydration and use of lemon-lime based beverages. He will have a renal US in 1 year to check on stone formation. He had a PSA and urine sent. He will trial Tamsulosin with precautions. Pt will follow up with a transrectal US of the prostate gland in 3 weeks.

## 2022-10-15 LAB
APPEARANCE: CLEAR
BACTERIA: NEGATIVE
BILIRUBIN URINE: NEGATIVE
BLOOD URINE: NEGATIVE
CALCIUM OXALATE CRYSTALS: ABNORMAL
COLOR: YELLOW
GLUCOSE QUALITATIVE U: NEGATIVE
HYALINE CASTS: 1 /LPF
KETONES URINE: NEGATIVE
LEUKOCYTE ESTERASE URINE: NEGATIVE
MICROSCOPIC-UA: NORMAL
NITRITE URINE: NEGATIVE
PH URINE: 6
PROTEIN URINE: NORMAL
RED BLOOD CELLS URINE: 2 /HPF
SPECIFIC GRAVITY URINE: 1.02
SQUAMOUS EPITHELIAL CELLS: 1 /HPF
UROBILINOGEN URINE: ABNORMAL
WHITE BLOOD CELLS URINE: 3 /HPF

## 2022-10-19 LAB — URINE CYTOLOGY: NORMAL

## 2022-10-20 RX ORDER — TAMSULOSIN HYDROCHLORIDE 0.4 MG/1
0.4 CAPSULE ORAL
Qty: 21 | Refills: 0 | Status: COMPLETED | OUTPATIENT
Start: 2022-10-13 | End: 2022-10-20

## 2022-11-09 ENCOUNTER — OUTPATIENT (OUTPATIENT)
Dept: OUTPATIENT SERVICES | Facility: HOSPITAL | Age: 67
LOS: 1 days | End: 2022-11-09
Payer: COMMERCIAL

## 2022-11-09 ENCOUNTER — APPOINTMENT (OUTPATIENT)
Dept: RADIOLOGY | Facility: CLINIC | Age: 67
End: 2022-11-09

## 2022-11-09 DIAGNOSIS — Z00.8 ENCOUNTER FOR OTHER GENERAL EXAMINATION: ICD-10-CM

## 2022-11-09 DIAGNOSIS — Z95.5 PRESENCE OF CORONARY ANGIOPLASTY IMPLANT AND GRAFT: Chronic | ICD-10-CM

## 2022-11-09 DIAGNOSIS — N20.0 CALCULUS OF KIDNEY: ICD-10-CM

## 2022-11-09 DIAGNOSIS — Z90.49 ACQUIRED ABSENCE OF OTHER SPECIFIED PARTS OF DIGESTIVE TRACT: Chronic | ICD-10-CM

## 2022-11-09 PROCEDURE — 74018 RADEX ABDOMEN 1 VIEW: CPT | Mod: 26

## 2022-11-09 PROCEDURE — 74018 RADEX ABDOMEN 1 VIEW: CPT

## 2022-11-10 ENCOUNTER — APPOINTMENT (OUTPATIENT)
Dept: UROLOGY | Facility: CLINIC | Age: 67
End: 2022-11-10

## 2022-11-10 VITALS
BODY MASS INDEX: 33.34 KG/M2 | OXYGEN SATURATION: 97 % | WEIGHT: 220 LBS | SYSTOLIC BLOOD PRESSURE: 142 MMHG | DIASTOLIC BLOOD PRESSURE: 72 MMHG | HEIGHT: 68 IN | HEART RATE: 72 BPM

## 2022-11-10 PROCEDURE — 76857 US EXAM PELVIC LIMITED: CPT

## 2022-11-10 PROCEDURE — 99213 OFFICE O/P EST LOW 20 MIN: CPT | Mod: 25

## 2022-11-10 PROCEDURE — 51741 ELECTRO-UROFLOWMETRY FIRST: CPT

## 2022-11-10 PROCEDURE — 76872 US TRANSRECTAL: CPT

## 2022-11-11 NOTE — REVIEW OF SYSTEMS
[Feeling Tired] : feeling tired [Vomiting] : vomiting [see HPI] : see HPI [Seen by urologist before (Name)  ___] : Preciously seen by a urologist: [unfilled] [Blood in urine that you can see] : blood visible in urine [Told you have blood in urine on a urine test] : told blood was present in a urine test [History of kidney stones] : history of kidney stones [Wake up at night to urinate  How many times?  ___] : wakes up to urinate [unfilled] times during the night [Interrupted urine stream] : interrupted urine stream [Negative] : Heme/Lymph [FreeTextEntry2] : nausea

## 2022-11-11 NOTE — HISTORY OF PRESENT ILLNESS
[FreeTextEntry1] : 67M presents today with a CC of a history of a ureteral stone. Pt is asymptomatic but is unsure if he passed his ureteral stone or not.

## 2022-11-11 NOTE — END OF VISIT
[FreeTextEntry3] : His stone will be checked with an US of the bladder and kidneys. Plans to follow.

## 2022-12-02 ENCOUNTER — APPOINTMENT (OUTPATIENT)
Dept: ULTRASOUND IMAGING | Facility: CLINIC | Age: 67
End: 2022-12-02

## 2022-12-02 ENCOUNTER — OUTPATIENT (OUTPATIENT)
Dept: OUTPATIENT SERVICES | Facility: HOSPITAL | Age: 67
LOS: 1 days | End: 2022-12-02
Payer: COMMERCIAL

## 2022-12-02 DIAGNOSIS — Z95.5 PRESENCE OF CORONARY ANGIOPLASTY IMPLANT AND GRAFT: Chronic | ICD-10-CM

## 2022-12-02 DIAGNOSIS — N20.0 CALCULUS OF KIDNEY: ICD-10-CM

## 2022-12-02 DIAGNOSIS — Z90.49 ACQUIRED ABSENCE OF OTHER SPECIFIED PARTS OF DIGESTIVE TRACT: Chronic | ICD-10-CM

## 2022-12-02 PROCEDURE — 76770 US EXAM ABDO BACK WALL COMP: CPT | Mod: 26

## 2022-12-02 PROCEDURE — 76770 US EXAM ABDO BACK WALL COMP: CPT

## 2022-12-06 ENCOUNTER — NON-APPOINTMENT (OUTPATIENT)
Age: 67
End: 2022-12-06

## 2022-12-16 ENCOUNTER — RX RENEWAL (OUTPATIENT)
Age: 67
End: 2022-12-16

## 2023-03-23 ENCOUNTER — NON-APPOINTMENT (OUTPATIENT)
Age: 68
End: 2023-03-23

## 2023-04-25 ENCOUNTER — APPOINTMENT (OUTPATIENT)
Dept: UROLOGY | Facility: CLINIC | Age: 68
End: 2023-04-25
Payer: MEDICARE

## 2023-04-25 VITALS
WEIGHT: 222 LBS | DIASTOLIC BLOOD PRESSURE: 75 MMHG | SYSTOLIC BLOOD PRESSURE: 154 MMHG | RESPIRATION RATE: 14 BRPM | OXYGEN SATURATION: 100 % | HEART RATE: 50 BPM | TEMPERATURE: 98 F | HEIGHT: 68 IN | BODY MASS INDEX: 33.65 KG/M2

## 2023-04-25 PROCEDURE — 99214 OFFICE O/P EST MOD 30 MIN: CPT

## 2023-04-26 LAB
APPEARANCE: CLEAR
BACTERIA: NEGATIVE /HPF
BILIRUBIN URINE: NEGATIVE
BLOOD URINE: ABNORMAL
CAST: 0 /LPF
COLOR: YELLOW
EPITHELIAL CELLS: 1 /HPF
GLUCOSE QUALITATIVE U: NEGATIVE MG/DL
KETONES URINE: NEGATIVE MG/DL
LEUKOCYTE ESTERASE URINE: NEGATIVE
MICROSCOPIC-UA: NORMAL
NITRITE URINE: NEGATIVE
PH URINE: 6.5
PROTEIN URINE: NEGATIVE MG/DL
RED BLOOD CELLS URINE: 20 /HPF
SPECIFIC GRAVITY URINE: 1.01
UROBILINOGEN URINE: 0.2 MG/DL
WHITE BLOOD CELLS URINE: 0 /HPF

## 2023-04-26 NOTE — PHYSICAL EXAM
[General Appearance - Well Developed] : well developed [General Appearance - Well Nourished] : well nourished [Normal Appearance] : normal appearance [Well Groomed] : well groomed [General Appearance - In No Acute Distress] : no acute distress [Abdomen Soft] : soft [Abdomen Tenderness] : non-tender [Costovertebral Angle Tenderness] : no ~M costovertebral angle tenderness [Urethral Meatus] : meatus normal [Urinary Bladder Findings] : the bladder was normal on palpation [Scrotum] : the scrotum was normal [Testes Mass (___cm)] : there were no testicular masses [No Prostate Nodules] : no prostate nodules [FreeTextEntry1] : Normal male genitalia. The prostate gland is small to moderate size and palpably benign.  [Edema] : no peripheral edema [Respiration, Rhythm And Depth] : normal respiratory rhythm and effort [] : no respiratory distress [Exaggerated Use Of Accessory Muscles For Inspiration] : no accessory muscle use [Oriented To Time, Place, And Person] : oriented to person, place, and time [Affect] : the affect was normal [Mood] : the mood was normal [Not Anxious] : not anxious [Normal Station and Gait] : the gait and station were normal for the patient's age [No Focal Deficits] : no focal deficits [No Palpable Adenopathy] : no palpable adenopathy

## 2023-04-26 NOTE — END OF VISIT
[FreeTextEntry3] : A renal US is ordered. He will follow up with cystourethroscopy in the near future. This was gone over in detail with him.

## 2023-04-26 NOTE — HISTORY OF PRESENT ILLNESS
[FreeTextEntry1] : 67M presents today with a CC of a recent finding of microhematuria. He denies any lower tract symptoms of note. He denies hematuria in the past.

## 2023-04-27 ENCOUNTER — OUTPATIENT (OUTPATIENT)
Dept: OUTPATIENT SERVICES | Facility: HOSPITAL | Age: 68
LOS: 1 days | End: 2023-04-27
Payer: MEDICARE

## 2023-04-27 ENCOUNTER — NON-APPOINTMENT (OUTPATIENT)
Age: 68
End: 2023-04-27

## 2023-04-27 ENCOUNTER — APPOINTMENT (OUTPATIENT)
Dept: ULTRASOUND IMAGING | Facility: CLINIC | Age: 68
End: 2023-04-27

## 2023-04-27 ENCOUNTER — APPOINTMENT (OUTPATIENT)
Dept: CT IMAGING | Facility: CLINIC | Age: 68
End: 2023-04-27
Payer: MEDICARE

## 2023-04-27 DIAGNOSIS — R89.6 ABNORMAL CYTOLOGICAL FINDINGS IN SPECIMENS FROM OTHER ORGANS, SYSTEMS AND TISSUES: ICD-10-CM

## 2023-04-27 DIAGNOSIS — Z90.49 ACQUIRED ABSENCE OF OTHER SPECIFIED PARTS OF DIGESTIVE TRACT: Chronic | ICD-10-CM

## 2023-04-27 DIAGNOSIS — Z95.5 PRESENCE OF CORONARY ANGIOPLASTY IMPLANT AND GRAFT: Chronic | ICD-10-CM

## 2023-04-27 LAB — URINE CYTOLOGY: NORMAL

## 2023-04-27 PROCEDURE — 74178 CT ABD&PLV WO CNTR FLWD CNTR: CPT | Mod: 26,MH

## 2023-04-27 PROCEDURE — 74178 CT ABD&PLV WO CNTR FLWD CNTR: CPT

## 2023-06-01 NOTE — DIETITIAN INITIAL EVALUATION ADULT. - ENERGY NEEDS
96
Height: 68Inches   Weight: 217lb   Body Mass Index (BMI): 33.0kg/m2   Ideal Body Weight Range: 154lb (+/-10%)   Percent Ideal Body Weight ~141%

## 2023-06-06 RX ORDER — TAMSULOSIN HYDROCHLORIDE 0.4 MG/1
0.4 CAPSULE ORAL
Qty: 90 | Refills: 3 | Status: COMPLETED | COMMUNITY
Start: 2022-11-10 | End: 2023-06-06

## 2023-06-13 ENCOUNTER — APPOINTMENT (OUTPATIENT)
Dept: UROLOGY | Facility: CLINIC | Age: 68
End: 2023-06-13
Payer: MEDICARE

## 2023-06-13 VITALS
BODY MASS INDEX: 33.34 KG/M2 | SYSTOLIC BLOOD PRESSURE: 148 MMHG | HEART RATE: 71 BPM | HEIGHT: 68 IN | OXYGEN SATURATION: 98 % | TEMPERATURE: 97.6 F | DIASTOLIC BLOOD PRESSURE: 75 MMHG | RESPIRATION RATE: 14 BRPM | WEIGHT: 220 LBS

## 2023-06-13 PROCEDURE — 99213 OFFICE O/P EST LOW 20 MIN: CPT | Mod: 25

## 2023-06-13 PROCEDURE — 52000 CYSTOURETHROSCOPY: CPT

## 2023-06-13 NOTE — HISTORY OF PRESENT ILLNESS
[FreeTextEntry1] : This patient is here today for endoscopy.  He had a distal ureteral stone and has what is known as [place] para ureteral diverticuli.  He states that he felt much better 1 week ago thus may have passed his distal stone on the left side.

## 2023-06-13 NOTE — END OF VISIT
[FreeTextEntry3] : A sonogram of the kidneys and bladder are ordered to see if he passed a stone or not and he will start finasteride to see if we can involute the prostate somewhat

## 2023-06-20 ENCOUNTER — OUTPATIENT (OUTPATIENT)
Dept: OUTPATIENT SERVICES | Facility: HOSPITAL | Age: 68
LOS: 1 days | End: 2023-06-20
Payer: MEDICARE

## 2023-06-20 ENCOUNTER — APPOINTMENT (OUTPATIENT)
Dept: ULTRASOUND IMAGING | Facility: CLINIC | Age: 68
End: 2023-06-20
Payer: MEDICARE

## 2023-06-20 DIAGNOSIS — N20.0 CALCULUS OF KIDNEY: ICD-10-CM

## 2023-06-20 DIAGNOSIS — Z90.49 ACQUIRED ABSENCE OF OTHER SPECIFIED PARTS OF DIGESTIVE TRACT: Chronic | ICD-10-CM

## 2023-06-20 DIAGNOSIS — Z95.5 PRESENCE OF CORONARY ANGIOPLASTY IMPLANT AND GRAFT: Chronic | ICD-10-CM

## 2023-06-20 PROCEDURE — 76775 US EXAM ABDO BACK WALL LIM: CPT

## 2023-06-20 PROCEDURE — 76775 US EXAM ABDO BACK WALL LIM: CPT | Mod: 26

## 2023-06-21 ENCOUNTER — NON-APPOINTMENT (OUTPATIENT)
Age: 68
End: 2023-06-21

## 2023-07-18 ENCOUNTER — APPOINTMENT (OUTPATIENT)
Dept: CT IMAGING | Facility: CLINIC | Age: 68
End: 2023-07-18
Payer: MEDICARE

## 2023-07-18 ENCOUNTER — OUTPATIENT (OUTPATIENT)
Dept: OUTPATIENT SERVICES | Facility: HOSPITAL | Age: 68
LOS: 1 days | End: 2023-07-18
Payer: MEDICARE

## 2023-07-18 DIAGNOSIS — Z90.49 ACQUIRED ABSENCE OF OTHER SPECIFIED PARTS OF DIGESTIVE TRACT: Chronic | ICD-10-CM

## 2023-07-18 DIAGNOSIS — Z95.5 PRESENCE OF CORONARY ANGIOPLASTY IMPLANT AND GRAFT: Chronic | ICD-10-CM

## 2023-07-18 DIAGNOSIS — R41.3 OTHER AMNESIA: ICD-10-CM

## 2023-07-18 PROCEDURE — 70450 CT HEAD/BRAIN W/O DYE: CPT | Mod: 26,MH

## 2023-07-18 PROCEDURE — 70450 CT HEAD/BRAIN W/O DYE: CPT

## 2023-08-11 NOTE — DISCHARGE NOTE PROVIDER - NSDCHHNEEDSERVICE_GEN_ALL_CORE
knee replacement and shoulder replacement. Stent.
Rehabilitation services/Observation and assessment/Teaching and training

## 2023-10-02 NOTE — ED STATDOCS - PATIENT PORTAL LINK FT
You can access the FollowMyHealth Patient Portal offered by [facility] by registering at the following website: [email or web address]. By joining [employer]’s FollowMyHealth portal, you will also be able to view your health information using other applications (apps) compatible with our system. High Dose Vitamin A Pregnancy And Lactation Text: High dose vitamin A therapy is contraindicated during pregnancy and breast feeding.

## 2024-06-02 ENCOUNTER — RX RENEWAL (OUTPATIENT)
Age: 69
End: 2024-06-02

## 2024-06-03 ENCOUNTER — NON-APPOINTMENT (OUTPATIENT)
Age: 69
End: 2024-06-03

## 2024-06-14 ENCOUNTER — APPOINTMENT (OUTPATIENT)
Dept: ULTRASOUND IMAGING | Facility: CLINIC | Age: 69
End: 2024-06-14
Payer: MEDICARE

## 2024-06-14 ENCOUNTER — OUTPATIENT (OUTPATIENT)
Dept: OUTPATIENT SERVICES | Facility: HOSPITAL | Age: 69
LOS: 1 days | End: 2024-06-14
Payer: MEDICARE

## 2024-06-14 DIAGNOSIS — N20.0 CALCULUS OF KIDNEY: ICD-10-CM

## 2024-06-14 DIAGNOSIS — Z90.49 ACQUIRED ABSENCE OF OTHER SPECIFIED PARTS OF DIGESTIVE TRACT: Chronic | ICD-10-CM

## 2024-06-14 DIAGNOSIS — Z95.5 PRESENCE OF CORONARY ANGIOPLASTY IMPLANT AND GRAFT: Chronic | ICD-10-CM

## 2024-06-14 PROCEDURE — 76770 US EXAM ABDO BACK WALL COMP: CPT

## 2024-06-14 PROCEDURE — 76770 US EXAM ABDO BACK WALL COMP: CPT | Mod: 26

## 2024-06-15 LAB
APPEARANCE: CLEAR
BACTERIA: NEGATIVE /HPF
BILIRUBIN URINE: NEGATIVE
BLOOD URINE: ABNORMAL
CAST: 2 /LPF
COLOR: YELLOW
EPITHELIAL CELLS: 5 /HPF
GLUCOSE QUALITATIVE U: NEGATIVE MG/DL
KETONES URINE: NEGATIVE MG/DL
LEUKOCYTE ESTERASE URINE: ABNORMAL
MICROSCOPIC-UA: NORMAL
NITRITE URINE: NEGATIVE
PH URINE: 6
PROTEIN URINE: 30 MG/DL
PSA SERPL-MCNC: 0.7 NG/ML
RED BLOOD CELLS URINE: 42 /HPF
REVIEW: NORMAL
SPECIFIC GRAVITY URINE: 1.03
UROBILINOGEN URINE: 1 MG/DL
WHITE BLOOD CELLS URINE: 2 /HPF

## 2024-06-18 ENCOUNTER — APPOINTMENT (OUTPATIENT)
Dept: UROLOGY | Facility: CLINIC | Age: 69
End: 2024-06-18
Payer: MEDICARE

## 2024-06-18 VITALS
OXYGEN SATURATION: 98 % | HEIGHT: 68 IN | HEART RATE: 60 BPM | WEIGHT: 210 LBS | DIASTOLIC BLOOD PRESSURE: 79 MMHG | SYSTOLIC BLOOD PRESSURE: 138 MMHG | RESPIRATION RATE: 16 BRPM | BODY MASS INDEX: 31.83 KG/M2

## 2024-06-18 DIAGNOSIS — N20.0 CALCULUS OF KIDNEY: ICD-10-CM

## 2024-06-18 DIAGNOSIS — N40.1 BENIGN PROSTATIC HYPERPLASIA WITH LOWER URINARY TRACT SYMPMS: ICD-10-CM

## 2024-06-18 DIAGNOSIS — N13.8 BENIGN PROSTATIC HYPERPLASIA WITH LOWER URINARY TRACT SYMPMS: ICD-10-CM

## 2024-06-18 DIAGNOSIS — R31.29 OTHER MICROSCOPIC HEMATURIA: ICD-10-CM

## 2024-06-18 PROCEDURE — 76857 US EXAM PELVIC LIMITED: CPT

## 2024-06-18 PROCEDURE — 99212 OFFICE O/P EST SF 10 MIN: CPT | Mod: 25

## 2024-06-18 PROCEDURE — 99213 OFFICE O/P EST LOW 20 MIN: CPT

## 2024-06-18 PROCEDURE — 76872 US TRANSRECTAL: CPT

## 2024-06-18 RX ORDER — POTASSIUM CITRATE 10 MEQ/1
10 MEQ TABLET, EXTENDED RELEASE ORAL TWICE DAILY
Qty: 180 | Refills: 3 | Status: ACTIVE | COMMUNITY
Start: 2024-06-18 | End: 1900-01-01

## 2024-06-18 RX ORDER — TAMSULOSIN HYDROCHLORIDE 0.4 MG/1
0.4 CAPSULE ORAL
Qty: 90 | Refills: 3 | Status: ACTIVE | COMMUNITY
Start: 2022-10-19 | End: 1900-01-01

## 2024-06-18 RX ORDER — FINASTERIDE 5 MG/1
5 TABLET, FILM COATED ORAL
Qty: 90 | Refills: 3 | Status: ACTIVE | COMMUNITY
Start: 2023-06-13 | End: 1900-01-01

## 2024-06-18 RX ORDER — HYDROCHLOROTHIAZIDE 12.5 MG/1
12.5 TABLET ORAL
Qty: 90 | Refills: 3 | Status: ACTIVE | COMMUNITY
Start: 2024-06-18 | End: 1900-01-01

## 2024-06-18 NOTE — ASSESSMENT
[FreeTextEntry1] : Pt with hematuria and atypical cytology with a history of stones, including ureteral stone.  Labs are sent and he will follow up with a CAT scan of the abdomen and pelvis with and without IV contrast. A sonogram of the prostate and bladder and cystoscopy are scheduled as well. I renewed his tamsulosin and finasteride and also started him on hydrochlorothiazide 12.5 mg daily, allopurinol 100 mg daily, and potassium citrate 10 mg twice daily.

## 2024-06-18 NOTE — END OF VISIT
[FreeTextEntry3] :  All medical record entries made by Chema Figueredo (Scribe) were at my, Dr. aDvid Mckinley's, direction and personally dictated by me on 06/18/2024. I have reviewed the chart and agree that the record accurately reflects my personal performance of the history, physical exam, assessment and plan. I have also personally directed, reviewed, and agreed with the chart.

## 2024-06-18 NOTE — HISTORY OF PRESENT ILLNESS
[FreeTextEntry1] : 69M CC history of urinary stones and hematuria. Pt presents today for a checkup. His PSA is fine but the urine shows microhematuria and atypical cells. He has had no symptoms. In the past he had a CAT scan showing bilaterally ureteral diverticuli and a possible left ureteral stone. He is asymptomatic at this time.

## 2024-06-19 ENCOUNTER — APPOINTMENT (OUTPATIENT)
Dept: RADIOLOGY | Facility: CLINIC | Age: 69
End: 2024-06-19
Payer: MEDICARE

## 2024-06-19 ENCOUNTER — APPOINTMENT (OUTPATIENT)
Dept: CT IMAGING | Facility: CLINIC | Age: 69
End: 2024-06-19
Payer: MEDICARE

## 2024-06-19 ENCOUNTER — OUTPATIENT (OUTPATIENT)
Dept: OUTPATIENT SERVICES | Facility: HOSPITAL | Age: 69
LOS: 1 days | End: 2024-06-19
Payer: MEDICARE

## 2024-06-19 DIAGNOSIS — R31.29 OTHER MICROSCOPIC HEMATURIA: ICD-10-CM

## 2024-06-19 DIAGNOSIS — Z90.49 ACQUIRED ABSENCE OF OTHER SPECIFIED PARTS OF DIGESTIVE TRACT: Chronic | ICD-10-CM

## 2024-06-19 DIAGNOSIS — N40.1 BENIGN PROSTATIC HYPERPLASIA WITH LOWER URINARY TRACT SYMPTOMS: ICD-10-CM

## 2024-06-19 DIAGNOSIS — Z95.5 PRESENCE OF CORONARY ANGIOPLASTY IMPLANT AND GRAFT: Chronic | ICD-10-CM

## 2024-06-19 PROCEDURE — 74178 CT ABD&PLV WO CNTR FLWD CNTR: CPT | Mod: 26,MH

## 2024-06-19 PROCEDURE — 74018 RADEX ABDOMEN 1 VIEW: CPT | Mod: 26

## 2024-06-19 PROCEDURE — 74178 CT ABD&PLV WO CNTR FLWD CNTR: CPT

## 2024-06-19 PROCEDURE — 74018 RADEX ABDOMEN 1 VIEW: CPT

## 2024-06-21 LAB — URINE CYTOLOGY: NORMAL

## 2024-07-12 ENCOUNTER — APPOINTMENT (OUTPATIENT)
Dept: UROLOGY | Facility: CLINIC | Age: 69
End: 2024-07-12
Payer: MEDICARE

## 2024-07-12 VITALS
OXYGEN SATURATION: 95 % | RESPIRATION RATE: 14 BRPM | DIASTOLIC BLOOD PRESSURE: 69 MMHG | HEART RATE: 87 BPM | BODY MASS INDEX: 31.83 KG/M2 | SYSTOLIC BLOOD PRESSURE: 125 MMHG | HEIGHT: 68 IN | TEMPERATURE: 97.5 F | WEIGHT: 210 LBS

## 2024-07-12 DIAGNOSIS — N13.8 BENIGN PROSTATIC HYPERPLASIA WITH LOWER URINARY TRACT SYMPMS: ICD-10-CM

## 2024-07-12 DIAGNOSIS — N40.1 BENIGN PROSTATIC HYPERPLASIA WITH LOWER URINARY TRACT SYMPMS: ICD-10-CM

## 2024-07-12 PROCEDURE — 99212 OFFICE O/P EST SF 10 MIN: CPT | Mod: 25

## 2024-07-12 PROCEDURE — 76872 US TRANSRECTAL: CPT

## 2024-07-12 PROCEDURE — 76857 US EXAM PELVIC LIMITED: CPT

## 2024-07-16 ENCOUNTER — APPOINTMENT (OUTPATIENT)
Dept: UROLOGY | Facility: CLINIC | Age: 69
End: 2024-07-16
Payer: MEDICARE

## 2024-07-16 VITALS
BODY MASS INDEX: 31.83 KG/M2 | OXYGEN SATURATION: 97 % | DIASTOLIC BLOOD PRESSURE: 73 MMHG | RESPIRATION RATE: 16 BRPM | HEIGHT: 68 IN | WEIGHT: 210 LBS | HEART RATE: 81 BPM | SYSTOLIC BLOOD PRESSURE: 137 MMHG

## 2024-07-16 DIAGNOSIS — R31.29 OTHER MICROSCOPIC HEMATURIA: ICD-10-CM

## 2024-07-16 PROCEDURE — 52000 CYSTOURETHROSCOPY: CPT

## 2024-07-23 NOTE — ED STATDOCS - CHPI ED SYMPTOM POS
Jaimeveronica Powellnestor Stewart Patient Age: 18 year old  MESSAGE: Interpreting service used: No    Insurance on file confirmed with caller: Yes    Pediatrics- Reason for call: Vaccine/ Medication Injection Request- Vaccine Questions-     Question(s):  immunizations-mom wants to make sure pt is up to date.     Is the patient currently having any symptoms? No- Route to Provider's Clinical Support Pool    Message read back to caller for accuracy: Yes       ALLERGIES:  Patient has no known allergies.  No current outpatient medications on file.     No current facility-administered medications for this visit.     PHARMACY to use:           Pharmacy preference(s) on file:   [facility] [address] [place] [id number]  Phone: [phone] Fax: [phone]      CALL BACK INFO: Ok to leave response (including medical information) on answering machine      PCP: Salima Ochoa MD         INS: Payor: BLUE CROSS BLUE [facility] [place] / Plan: BLUE [facility] FEP / Product Type: PPO MISC   PATIENT ADDRESS:  [address] [place] [id number]      
Mom notified that the patient is up to date on vaccines. Mom stated understanding and denied questions  
PAIN

## 2024-12-20 NOTE — ASU PATIENT PROFILE, ADULT - PATIENT'S HEIGHT AND WEIGHT RECORDED IN THE VITAL SIGNS FLOWSHEET
Drug Drowsiness Insomnia/  agitation Gastrointestinal (nausea/diarrhea) Weight gain   Citalopram (Celexa) 0 1+ 1+¶ 1+   Escitalopram (Lexapro) 0 1+ 1+¶ 1+   Fluoxetine (Prozac) 0 2+ 1+¶ 0   Paroxetine (Paxil) 1+ 1+ 1+¶ 2+   Sertraline (Zoloft) 0 2+ 2+¶? 1+     Celexa  May be used to treat:  Generalized anxiety disorder  Major depressive disorder  Obsessive-compulsive disorder  Panic disorder  Posttraumatic stress disorder    Common side effects may include:  sexual problems;  dizziness, drowsiness;  dry mouth, thirst, increased sweating or urination;  loss of appetite, nausea, diarrhea, constipation;  feeling anxious, agitated, or shaky;  feeling weak or tired, sleep problems (insomnia), yawning;  increased muscle movement;  nosebleed, heavy menstrual bleeding; or  cold symptoms such as stuffy nose, sneezing, sore throat.      Lexapro  May be used to treat:  Generalized anxiety disorder  Major depressive disorder  Obsessive-compulsive disorder  Panic disorder    Common side effects may include:  painful urination;  dizziness, drowsiness, tiredness, weakness;  feeling anxious or agitated;  increased muscle movements, feeling shaky;  sleep problems (insomnia);  sweating, dry mouth, increased thirst, loss of appetite;  nausea, constipation;  yawning;  nosebleed, heavy menstrual periods; or  decreased sex drive, impotence, or difficulty having an orgasm.  
infected right breast implant with pus emanating from necrotic wound
yes

## 2025-04-04 ENCOUNTER — EMERGENCY (EMERGENCY)
Facility: HOSPITAL | Age: 70
LOS: 0 days | Discharge: HOME | End: 2025-04-04
Attending: STUDENT IN AN ORGANIZED HEALTH CARE EDUCATION/TRAINING PROGRAM
Payer: MEDICARE

## 2025-04-04 VITALS
RESPIRATION RATE: 18 BRPM | HEART RATE: 85 BPM | SYSTOLIC BLOOD PRESSURE: 160 MMHG | TEMPERATURE: 98 F | OXYGEN SATURATION: 99 % | DIASTOLIC BLOOD PRESSURE: 83 MMHG

## 2025-04-04 VITALS — WEIGHT: 219.58 LBS | HEIGHT: 68 IN

## 2025-04-04 DIAGNOSIS — Z95.5 PRESENCE OF CORONARY ANGIOPLASTY IMPLANT AND GRAFT: Chronic | ICD-10-CM
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
## 2025-04-04 DIAGNOSIS — Z90.49 ACQUIRED ABSENCE OF OTHER SPECIFIED PARTS OF DIGESTIVE TRACT: Chronic | ICD-10-CM

## 2025-04-04 DIAGNOSIS — I10 ESSENTIAL (PRIMARY) HYPERTENSION: ICD-10-CM
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
## 2025-04-04 DIAGNOSIS — Z79.82 LONG TERM (CURRENT) USE OF ASPIRIN: ICD-10-CM

## 2025-04-04 DIAGNOSIS — E78.5 HYPERLIPIDEMIA, UNSPECIFIED: ICD-10-CM

## 2025-04-04 DIAGNOSIS — M25.552 PAIN IN LEFT HIP: ICD-10-CM

## 2025-04-04 DIAGNOSIS — S30.0XXA CONTUSION OF LOWER BACK AND PELVIS, INITIAL ENCOUNTER: ICD-10-CM

## 2025-04-04 DIAGNOSIS — J44.9 CHRONIC OBSTRUCTIVE PULMONARY DISEASE, UNSPECIFIED: ICD-10-CM

## 2025-04-04 LAB
ALBUMIN SERPL ELPH-MCNC: 3.8 G/DL — SIGNIFICANT CHANGE UP (ref 3.3–5)
ALP SERPL-CCNC: 50 U/L — SIGNIFICANT CHANGE UP (ref 40–120)
ALT FLD-CCNC: 21 U/L — SIGNIFICANT CHANGE UP (ref 12–78)
ANION GAP SERPL CALC-SCNC: 2 MMOL/L — LOW (ref 5–17)
ANISOCYTOSIS BLD QL: SLIGHT — SIGNIFICANT CHANGE UP
APTT BLD: 30 SEC — SIGNIFICANT CHANGE UP (ref 24.5–35.6)
AST SERPL-CCNC: 22 U/L — SIGNIFICANT CHANGE UP (ref 15–37)
BASOPHILS # BLD AUTO: 0.03 K/UL — SIGNIFICANT CHANGE UP (ref 0–0.2)
BASOPHILS NFR BLD AUTO: 0.2 % — SIGNIFICANT CHANGE UP (ref 0–2)
BILIRUB SERPL-MCNC: 1.4 MG/DL — HIGH (ref 0.2–1.2)
BLD GP AB SCN SERPL QL: SIGNIFICANT CHANGE UP
BUN SERPL-MCNC: 24 MG/DL — HIGH (ref 7–23)
CALCIUM SERPL-MCNC: 9.3 MG/DL — SIGNIFICANT CHANGE UP (ref 8.5–10.1)
CHLORIDE SERPL-SCNC: 109 MMOL/L — HIGH (ref 96–108)
CREAT SERPL-MCNC: 1.69 MG/DL — HIGH (ref 0.5–1.3)
DACRYOCYTES BLD QL SMEAR: SLIGHT — SIGNIFICANT CHANGE UP
EGFR: 44 ML/MIN/1.73M2 — LOW
EGFR: 44 ML/MIN/1.73M2 — LOW
ELLIPTOCYTES BLD QL SMEAR: SLIGHT — SIGNIFICANT CHANGE UP
EOSINOPHIL # BLD AUTO: 0.32 K/UL — SIGNIFICANT CHANGE UP (ref 0–0.5)
EOSINOPHIL NFR BLD AUTO: 2.5 % — SIGNIFICANT CHANGE UP (ref 0–6)
GLUCOSE SERPL-MCNC: 95 MG/DL — SIGNIFICANT CHANGE UP (ref 70–99)
HCT VFR BLD CALC: 37.1 % — LOW (ref 39–50)
HGB BLD-MCNC: 11.6 G/DL — LOW (ref 13–17)
IMM GRANULOCYTES # BLD AUTO: 0.04 K/UL — SIGNIFICANT CHANGE UP (ref 0–0.07)
IMM GRANULOCYTES NFR BLD AUTO: 0.3 % — SIGNIFICANT CHANGE UP (ref 0–0.9)
INR BLD: 1.08 RATIO — SIGNIFICANT CHANGE UP (ref 0.85–1.16)
LYMPHOCYTES # BLD AUTO: 1.24 K/UL — SIGNIFICANT CHANGE UP (ref 1–3.3)
MCHC RBC-ENTMCNC: 31.3 G/DL — LOW (ref 32–36)
MCV RBC AUTO: 66.4 FL — LOW (ref 80–100)
MICROCYTES BLD QL: ABNORMAL
MONOCYTES # BLD AUTO: 1.26 K/UL — HIGH (ref 0–0.9)
NRBC # BLD AUTO: 0 K/UL — SIGNIFICANT CHANGE UP (ref 0–0)
NRBC # FLD: 0 K/UL — SIGNIFICANT CHANGE UP (ref 0–0)
NRBC BLD AUTO-RTO: 0 /100 WBCS — SIGNIFICANT CHANGE UP (ref 0–0)
PLAT MORPH BLD: NORMAL — SIGNIFICANT CHANGE UP
PLATELET # BLD AUTO: 244 K/UL — SIGNIFICANT CHANGE UP (ref 150–400)
PMV BLD: 10.5 FL — SIGNIFICANT CHANGE UP (ref 7–13)
POIKILOCYTOSIS BLD QL AUTO: SLIGHT — SIGNIFICANT CHANGE UP
POTASSIUM SERPL-MCNC: 4.2 MMOL/L — SIGNIFICANT CHANGE UP (ref 3.5–5.3)
POTASSIUM SERPL-SCNC: 4.2 MMOL/L — SIGNIFICANT CHANGE UP (ref 3.5–5.3)
PROT SERPL-MCNC: 7 GM/DL — SIGNIFICANT CHANGE UP (ref 6–8.3)
PROTHROM AB SERPL-ACNC: 12.4 SEC — SIGNIFICANT CHANGE UP (ref 9.9–13.4)
RBC # BLD: 5.59 M/UL — SIGNIFICANT CHANGE UP (ref 4.2–5.8)
RBC # FLD: 15.4 % — HIGH (ref 10.3–14.5)
SODIUM SERPL-SCNC: 142 MMOL/L — SIGNIFICANT CHANGE UP (ref 135–145)
STOMATOCYTES BLD QL SMEAR: SLIGHT — SIGNIFICANT CHANGE UP
TARGETS BLD QL SMEAR: SLIGHT — SIGNIFICANT CHANGE UP
WBC # BLD: 12.73 K/UL — HIGH (ref 3.8–10.5)
WBC # FLD AUTO: 12.73 K/UL — HIGH (ref 3.8–10.5)
WBC MORPHOLOGY: NORMAL — SIGNIFICANT CHANGE UP

## 2025-04-04 PROCEDURE — 99285 EMERGENCY DEPT VISIT HI MDM: CPT

## 2025-04-04 PROCEDURE — 85025 COMPLETE CBC W/AUTO DIFF WBC: CPT

## 2025-04-04 PROCEDURE — 85730 THROMBOPLASTIN TIME PARTIAL: CPT

## 2025-04-04 PROCEDURE — 99284 EMERGENCY DEPT VISIT MOD MDM: CPT | Mod: 25

## 2025-04-04 PROCEDURE — 86901 BLOOD TYPING SEROLOGIC RH(D): CPT

## 2025-04-04 PROCEDURE — 85610 PROTHROMBIN TIME: CPT

## 2025-04-04 PROCEDURE — 36415 COLL VENOUS BLD VENIPUNCTURE: CPT

## 2025-04-04 PROCEDURE — 74177 CT ABD & PELVIS W/CONTRAST: CPT | Mod: 26

## 2025-04-04 PROCEDURE — 80053 COMPREHEN METABOLIC PANEL: CPT

## 2025-04-04 PROCEDURE — 86900 BLOOD TYPING SEROLOGIC ABO: CPT

## 2025-04-04 PROCEDURE — 86850 RBC ANTIBODY SCREEN: CPT

## 2025-04-04 PROCEDURE — 74177 CT ABD & PELVIS W/CONTRAST: CPT

## 2025-04-04 PROCEDURE — 36000 PLACE NEEDLE IN VEIN: CPT | Mod: XU

## 2025-04-04 NOTE — ED ADULT NURSE NOTE - OBJECTIVE STATEMENT
69 year old male presenting to ER after fall off ladder from approximately 6 feet in height onto grass - pt states he lost his footing, denies lightheadedness or dizziness, no CP or SOB, denies head strike, not on anticoagulants. Pt states he fell onto his left buttock and was able to get up and ambulate but having some pain. No obvious deformity or injury.

## 2025-04-04 NOTE — ED PROVIDER NOTE - NSICDXPASTSURGICALHX_GEN_ALL_CORE_FT
PAST SURGICAL HISTORY:  Coronary stent patent placement    H/O colectomy     S/P cholecystectomy

## 2025-04-04 NOTE — ED PROVIDER NOTE - PATIENT PORTAL LINK FT
You can access the FollowMyHealth Patient Portal offered by [facility] by registering at the following website: [email or web address]. By joining [employer]’s FollowMyHealth portal, you will also be able to view your health information using other applications (apps) compatible with our system.

## 2025-04-04 NOTE — ED PROVIDER NOTE - NSFOLLOWUPINSTRUCTIONS_ED_ALL_ED_FT
Hematoma  A hematoma is a collection of blood under the skin, in an organ, in a body space, in a joint space, or in other tissue. The blood can thicken (clot) to form a lump that you can see and feel. The lump is often firm and may become sore and tender. Most hematomas get better in a few days to weeks. However, some hematomas may be serious and require medical care. Hematomas can range from very small to very large.    What are the causes?  This condition is caused by:  A blunt or penetrating injury.  Leakage from a blood vessel under the skin.  Some medical procedures, including surgeries, such as oral surgery, face lifts, and surgeries on the joints.  Some medical conditions that cause bleeding or bruising. There may be multiple hematomas that appear in different areas of the body.  What increases the risk?  You are more likely to develop this condition if:  You are an older adult.  You use blood thinners.  You regularly use NSAIDs, such as ibuprofen, for pain.  You play contact sports.  What are the signs or symptoms?  Comparison of a normal ankle and an ankle that is swollen and bruised.  Symptoms of this condition depend on where the hematoma is located.    Common symptoms of a hematoma that is under the skin include:  A firm lump on the body.  Pain and tenderness in the area.  Bruising. Blue, dark blue, purple-red, or yellowish skin (discoloration) may appear at the site of the hematoma if the hematoma is close to the surface of the skin.  Common symptoms of a hematoma that is deep in the tissues or body spaces may be less obvious. They include:  A collection of blood in the stomach (intra-abdominal hematoma). This may cause pain in the abdomen, weakness, fainting, and shortness of breath.  A collection of blood in the head (intracranial hematoma). This may cause a headache or symptoms such as weakness, trouble speaking or understanding, or a change in consciousness.  How is this diagnosed?  This condition is diagnosed based on:  Your medical history.  A physical exam.  Imaging tests, such as an ultrasound or CT scan. These may be needed if your health care provider suspects a hematoma in deeper tissues or body spaces.  Blood tests. These may be needed if your health care provider believes that the hematoma is caused by a medical condition.  How is this treated?  Treatment for this condition depends on the cause, size, and location of the hematoma. Treatment may include:  Doing nothing. The majority of hematomas do not need treatment as many of them go away on their own.  Surgery or close monitoring. This may be needed for large hematomas or hematomas that affect vital organs.  Medicines. Medicines may be given if there is an underlying medical cause for the hematoma.  Follow these instructions at home:  Managing pain, stiffness, and swelling    Bag of ice on a towel on the skin.  If directed, put ice on the injured area. To do this:  Put ice in a plastic bag.  Place a towel between your skin and the bag.  Leave the ice on for 20 minutes, 2–3 times a day for the first couple of days.  If your skin turns bright red, remove the ice right away to prevent skin damage. The risk of skin damage is higher if you cannot feel pain, heat, or cold.  If directed, apply heat to the affected area as often as told by your health care provider. Use the heat source that your health care provider recommends, such as a moist heat pack or a heating pad.  Place a towel between your skin and the heat source.  Leave the heat on for 20–30 minutes.  If your skin turns bright red, remove the heat right away to prevent burns. The risk of burns is higher if you cannot feel pain, heat, or cold.  Raise (elevate) the injured area above the level of your heart while you are sitting or lying down.  If directed, wrap the affected area with an elastic bandage. The bandage applies pressure (compression) to the area, which may help to reduce swelling and promote healing. Do not wrap the bandage too tightly around the affected area.  If your hematoma is on a leg or foot (lower extremity) and is painful, your health care provider may recommend crutches. Use them as told by your health care provider.  General instructions    Take over-the-counter and prescription medicines only as told by your health care provider.  Rest the injured area as directed by your health care provider.  Keep all follow-up visits. Your health care provider may want to see how your hematoma is progressing with treatment.  Contact a health care provider if:  You have a fever.  The swelling or discoloration gets worse.  You develop more hematomas.  Your pain is worse or your pain is not controlled with medicine.  Your skin over the hematoma breaks or starts bleeding.  Get help right away if:  Your hematoma is in your chest or abdomen and you have weakness, shortness of breath, or a change in consciousness.  You have a hematoma on your scalp that is caused by a fall or injury, and you also have:  A headache that gets worse.  Trouble speaking or understanding speech.  Weakness.  A change in alertness or consciousness.  These symptoms may be an emergency. Get help right away. Call 911.  Do not wait to see if the symptoms will go away.  Do not drive yourself to the hospital.  This information is not intended to replace advice given to you by your health care provider. Make sure you discuss any questions you have with your health care provider.    Document Revised: 06/12/2023 Document Reviewed: 06/12/2023  [employer] Patient Education © 2025 [employer]  [employer] logo  Terms and Conditions  Privacy Policy  Editorial Policy  All content on this site: Copyright © 2025 [employer], its licensors, and contributors. All rights are reserved, including those for text and data mining, AI training, and similar technologies. For all open access content, the Creative Commons licensing terms apply.  Cookies are used by this site. To decline or learn more, visit our Cookies page.  [employer]

## 2025-04-04 NOTE — ED PROVIDER NOTE - OBJECTIVE STATEMENT
69-year-old male past medical hx of hypertension, hyperlipidemia, COPD on ASA presents ED status post fall off a ladder onto his left buttock.  Patient has been ambulatory since falling at 3 PM but has a bruise to the left buttock and his wife was concerned so he came to the ER.  Patient denies head strike or LOC or any other injuries.

## 2025-04-04 NOTE — ED PROVIDER NOTE - PHYSICAL EXAMINATION
GEN: Patient awake alert NAD.   HEENT: normocephalic, atraumatic, EOMI, no scleral icterus, moist MM  CARDIAC: RRR, S1, S2, no murmur.   PULM: CTA B/L no wheeze, rhonchi, rales.   ABD: soft NT, ND, no rebound no guarding, no CVA tenderness.   MSK: Moving all extremities, no edema. 5/5 strength and full ROM in all extremities.   No extremity deformity, tenderness    NEURO: A&Ox3, gait normal, no focal neurological deficits, no CTL midline spinal ttp   SKIN: warm, dry, L gluteal ecchymosis,  equal pulses in all extremities

## 2025-04-04 NOTE — ED PROVIDER NOTE - CLINICAL SUMMARY MEDICAL DECISION MAKING FREE TEXT BOX
69-year-old male past medical hx of hypertension, hyperlipidemia, COPD on ASA presents ED status post fall off a ladder onto his left buttock.  patient with left gluteal ecchymosis with mild tenderness but no other signs of trauma and hemodynamically stable.  Compartments are soft equal pulses in all extremities.  Will rule out expanding hematoma, pelvic fracture versus other intra-abdominal injury.  Plan for screening labs, CT.  Reassess

## 2025-04-04 NOTE — ED ADULT NURSE NOTE - FINAL NURSING ELECTRONIC SIGNATURE
Subjective   Patient ID: Doug is a 86 year old male who presents for his Subsequent Annual Medicare Wellness Visit.    Chief Complaint   Patient presents with   • Medicare Wellness Visit       Patient Answered Medicare HRA Screening Questions  1.) Do you have an Advance directive, living will, or power of  for health care document that contains your wishes for end of life care? Yes    2.) Would you like additional information on advance directives? Yes    3.) During the past 4 weeks, how would you rate your health? Fair    4.) During the past 4 weeks, what was the hardest physical activity you could do for at least 2 minutes? Light    5.) Do you do moderate to strenuous exercise (brisk walk) for about 20 minutes for 3 or more days per week? No, but I am active with housework or yard work (vacuuming, raking, etc.)    6.) How many servings of the following would you typically eat in a day?  Fruits and Vegetables (1 serving = 1 piece of fruit, 1/2 cup fruits or vegetables) 2-3 per day  High Fiber / Whole Grain Foods (1 serving = 1 cup cold cereal, 1/2 cup cooked cereal, 1 slice bread) 2-3 per day  Fried or High Fat Foods (1 serving = 1 Conde, French Fries, chips, doughnut, fried chicken/fish) 1 per day  Sugar Sweetened Beverages (1 serving = 1 can or 12 oz cup of soda or juice) 1 per day    7.) Have you had a fall two or more times in the past year? No Value exists for the : AMB#599    8.) During the past 4 weeks, has your physical and emotional health limited your social activities with family, friends, neighbors, or other groups? Not at all    9.) Do you feel safe at home? Yes    10.) How often do you have trouble taking medicines the way you have been told to take them? I always take my prescribed medications    11.) Over the past 4 weeks how often have you experienced the following?  Bladder Control problems - (urine leaking)Never  Bowel control problems - Never  Teeth or Denture Problems -  Never  Bodily pain - Never  Tiredness or Fatigue - Never  Feeling stressed or overwhelmed - Never  Anger or frustration - Never  Problems with your hearing - Never  Problems using the telephone - Never  Problems with your balance - Never  Driven/Ridden in a car without wearing your seatbelt - Never  Sexual Problems - Never    12.) Do you need help with any of the following activities (bathing, grooming, feeding, toileting, getting out of bed/chairs)? None of these apply to me    13.) Do you need help with any of the following activities (going to places outside of walking distance, shopping, housework/laundry, preparing meals, handling own money)? None of these apply to me    14.) During the past 4 weeks, was someone available to help if you needed and wanted help? Yes, as much as I wanted    15.) How confident are you that you can control and manage most of your health problems? Very confident    Doug has no past medical history on file.  Please review the information below for past medical history.    Doug has Seasonal allergic rhinitis; Arteriosclerotic heart disease; Essential hypertension; Prostate cancer (CMS/[facility]); BPH associated with nocturia; Primary angle closure glaucoma of both eyes; Prediabetes; Bilateral carpal tunnel syndrome; and Medicare annual wellness visit, subsequent on their problem list.    Doug has no past surgical history on file.  The patient has no history of surgery.    His Family history is unknown by patient.  He does not recall the health history of his mother, father, or siblings.    Doug reports that he has never smoked. He has never used smokeless tobacco. He reports that he does not drink alcohol or use drugs.    Doug is allergic to simvastatin and zoloft.    Doug   Current Outpatient Medications   Medication Sig Dispense Refill   • tamsulosin (FLOMAX) 0.4 MG Cap Take 1 capsule by mouth daily after a meal. 90 capsule 3   • amLODIPine (NORVASC) 10 MG tablet Take 1 tablet by  mouth daily. 90 tablet 3   • losartan (COZAAR) 100 MG tablet Take 1 tablet by mouth daily. 90 tablet 3   • hydrochlorothiazide (HYDRODIURIL) 12.5 MG tablet Take 1 tablet by mouth daily. 90 tablet 3     No current facility-administered medications for this visit.        [facility]/pharmacy #[id number] - [place], [place] - [address] AT [place] [address] [address]  [facility] [address]  Phone: [phone] Fax: [phone]      Patient Care Team:  Jose G Lofton MD as PCP - General (Internal Medicine)    Screenings  ADLs  ADL Before Admission: Independent  ADL Needs Assist: No  ADL Score: 12    Short of Breath or Fatigue with ADL's: No  Recent Decline in ADL's: No    Mobility Assist Devices: None  Are you deaf or do you have serious difficulty  hearing? : No  Are you blind or do you have serious difficulty seeing, even when wearing glasses?: No  Sensory Support Devices: None    iADLs  Using Telephone: Independent  Grocery Shopping: Needs assistance  Preparing Meals: Independent  Doing Housework: Independent  Laundry: Independent  Taking Medication: Needs assistance  Pill Box Used: No  Managing Finances: Independent  Using Transportation: Independent  Still Driving: Yes  Eating: Independent  Needs Assistance With Food: Independent  Difficulty Chewing or Swallowing: No    Home Safety: The patient states that his home is safe.    Depression PHQ2/9:  Little interest or pleasure in activity?: Not at all  Feeling down, depressed or hopeless?: Not at all  Initial depression screening score:: 0         Depression assessment/plan: Depression screening is negative no further plan needed.     Cognitive/Functional Status:  Are you deaf or do you have serious difficulty  hearing? : No  Are you blind or do you have serious difficulty seeing, even when wearing glasses?: No  Are you blind or do you have serious difficulty seeing, even when wearing glasses?: No  Because of a physical, mental, or emotional condition, do you  have serious difficulty concentrating, remembering or making decisions? : No  Do you have serious difficulty walking or climbing stairs?: No  Do you have difficulty dressing or bathing?: No  Because of a physical, mental, or emotional condition, do you have difficulty doing errands alone?: No  Patient able to fill in the clock face with with 45 minutes past 10 o'clock?: Yes, clock is correct  Total Score: 3  Negative for cognitive impairment (no need to score CDT)  Cognitive Assessment: no evidence of cognitive dysfunction by direct observation    STEADI-Fall Risk  Assessment of Fall Risk (STEADI) for Patients equal/greater than 18 Years of Age: Yes  I have fallen in the past year: No  I Use or Have Been Advised to Use a Cane or Walker to Get Around Safely: No  Sometimes I Feel Unsteady When I am Walking: No  I Steady Myself by Holding Onto Furniture When Walking at Home: No  I am Worried About Falling: No  I Need to Push With My Hands to Stand Up from a Chair: No  I Have Some Trouble Stepping Up Onto a Curb: No  I Often Have to Rush to the Toilet: No  I Have Lost Some Feeling in My Feet: No  I Take Medicine That Sometimes Makes Me Feel Lightheaded or More Tired Than Usual: No  I Take Medicine to Help Me Sleep or Improve My Mood: No  STEADI Score Equal To/Greater Than 4: No    Hearing Impairment: Are you deaf or do you have serious difficulty  hearing? : No  Vision/Hearing Screening: Hearing Screening Comments: The patient hears normal volume speech well.  No audiometry testing was ordered.  Vision Screening Comments: Continue to follow-up with ophthalmology for bilateral glaucoma.  Patient's vision is good at this time.     Advanced care planning: Discussed with patient. Patient understand need and will complete forms.  Forms provided    Needed Screening/Treatment:   Cardiovascular screening - Lipids , Diabetes screening , Hepatitis C and Immunizations reviewed and patient needs: The patient declines all flu  vaccines.     Needed follow up:  None    HPI:  Patient is an 86-year-old -American male with a diagnosis of allergic rhinitis, hypertension, BPH, prostate carcinoma, prediabetes, bilateral glaucoma, chronic carpal tunnel syndrome, and coronary artery disease.  Today he presents to the office for a wellness visit.  No history of chest pain or shortness of breath.  The patient admits to difficulty understanding instructions secondary to not being able to read.  He has been compliant with his medications as directed.  The patient did not buy a wrist splint for his carpal tunnel syndrome.  He did not take Tylenol for his wrist pain.  The patient states that his wrist pain is greatest on the right.  The patient states that he forgot to bring his medications to the office today for review.  He is concerned that the color of his medication has changed.    Review of Systems   Constitutional: Negative.    HENT: Negative.    Eyes: Negative.    Respiratory: Negative.    Gastrointestinal: Negative.    Endocrine: Negative.    Genitourinary: Negative.    Musculoskeletal:        The patient complains of bilateral wrist pain.  See HPI for details.   Skin: Negative.    Allergic/Immunologic: Negative.    Neurological: Negative.    Hematological: Negative.    Psychiatric/Behavioral: Negative.        Objective   Vitals: /89   Pulse 73   Temp 98 °F (36.7 °C) (Oral)   Wt 78 kg (172 lb)   BMI 26.15 kg/m²   BSA 1.92 m²   Body mass index is 26.15 kg/m².  BMI ASSESSMENT/PLAN:  Patient BMI is within normal range.     Physical Exam  Vitals signs and nursing note reviewed.   Constitutional:       Comments: Elderly -American male in no distress.  He is alert and intermittently confused.  His wife is present in the exam room during his evaluation today.  The patient is a fair historian.   HENT:      Head: Normocephalic and atraumatic.   Eyes:      General: No scleral icterus.     Conjunctiva/sclera: Conjunctivae normal.       Pupils: Pupils are equal, round, and reactive to light.   Neck:      Musculoskeletal: Normal range of motion and neck supple.   Cardiovascular:      Rate and Rhythm: Normal rate and regular rhythm.      Heart sounds: Normal heart sounds. No murmur. No gallop.    Pulmonary:      Effort: Pulmonary effort is normal. No respiratory distress.      Breath sounds: Normal breath sounds. No wheezing or rales.   Chest:      Chest wall: No tenderness.   Abdominal:      General: Bowel sounds are normal. There is no distension.      Palpations: Abdomen is soft. There is no mass.      Tenderness: There is no tenderness. There is no guarding.   Musculoskeletal:         General: No tenderness.        Arms:       Comments: Bilateral bulging knees with minimal crepitus.  Negative joint effusion.  Negative tenderness.   Lymphadenopathy:      Cervical: No cervical adenopathy.   Skin:     General: Skin is warm and dry.      Findings: No rash.   Neurological:      Mental Status: He is alert and oriented to person, place, and time.      Cranial Nerves: No cranial nerve deficit.      Motor: No abnormal muscle tone.   Psychiatric:         Behavior: Behavior normal.         Thought Content: Thought content normal.         Judgement: Judgment normal.         Assessment   Problem List Items Addressed This Visit        Nervous    Bilateral carpal tunnel syndrome     The patient was again encouraged to purchase wrist splints and wear them during the day.  He was further encouraged to take Tylenol extra strength 1000 p.o. 4 times daily as needed.            Ophthalmic    Primary angle closure glaucoma of both eyes     The patient was encouraged to follow-up with ophthalmology as scheduled.             Respiratory    Seasonal allergic rhinitis     Allergic rhinitis is improved.  Continue fluticasone nasal inhaler daily as directed.            Circulatory    Arteriosclerotic heart disease     Coronary artery disease is stable.  Continue to  monitor.         Relevant Medications    amLODIPine (NORVASC) 10 MG tablet    losartan (COZAAR) 100 MG tablet    hydrochlorothiazide (HYDRODIURIL) 12.5 MG tablet    Essential hypertension     Blood pressure is stable.  Continue amlodipine 10 mg p.o. daily, losartan 100 mg, and hydrochlorothiazide 12.5 mg p.o. every morning.         Relevant Medications    amLODIPine (NORVASC) 10 MG tablet    losartan (COZAAR) 100 MG tablet    hydrochlorothiazide (HYDRODIURIL) 12.5 MG tablet       Urinary    BPH associated with nocturia     BPH is stable. Continue tamsulosin 0.4 mg p.o. daily.         Relevant Medications    tamsulosin (FLOMAX) 0.4 MG Cap       Oncologic    Prostate cancer (CMS/HCC)     Prostate cancer is in remission.  The patient is encouraged to follow-up with medical oncology as scheduled.            Other    Prediabetes     I have ordered a repeat hemoglobin A1c.         Medicare annual wellness visit, subsequent - Primary     I have performed a Medicare wellness visit today.  The patient will return to the office in 1 week to discuss his medications further.  He was strongly encouraged to bring a family member with him during the review of his medications and medical problems.               Schedule follow up: 1 week return with family members to discuss medications and medical problems.    Screening schedule/checklist for next 5-10 years:  Health Maintenance Due   Topic Date Due   • DTaP/Tdap/Td Vaccine (1 - Tdap) 07/09/1952   • Shingles Vaccine (1 of 2) 07/09/1984   • Medicare Wellness 65+  07/09/1999   • Pneumococcal Vaccine 65+ (1 of 2 - PCV13) 07/09/1999   • Influenza Vaccine (1) 09/01/2019   • Depression Screening  01/02/2020        A written education, counseling, referral, and plan for obtaining appropriate screening services has been given to patient. See patient instructions.    04-Apr-2025 23:40

## 2025-04-04 NOTE — ED ADULT TRIAGE NOTE - CHIEF COMPLAINT QUOTE
Pt presents to ED c/o left hip pain after falling down ladder. Pt states "Its a 12ft ladder but I wasn't at the top maybe half way when I fell so about 6ft. I did not hit my head or lose consciousness. I just want to make sure my hip is okay." Pt is ambulatory to triage.

## 2025-04-04 NOTE — ED PROVIDER NOTE - NS ED MD DISPO DISCHARGE CCDA
I have reviewed discharge instructions with the parent. The parent verbalized understanding.   Patient armband removed and shredded
Patient/Caregiver provided printed discharge information.

## 2025-06-18 ENCOUNTER — RX RENEWAL (OUTPATIENT)
Age: 70
End: 2025-06-18

## 2025-08-04 ENCOUNTER — APPOINTMENT (OUTPATIENT)
Dept: UROLOGY | Facility: CLINIC | Age: 70
End: 2025-08-04

## 2025-08-04 VITALS
HEART RATE: 53 BPM | DIASTOLIC BLOOD PRESSURE: 81 MMHG | RESPIRATION RATE: 15 BRPM | BODY MASS INDEX: 31.98 KG/M2 | HEIGHT: 68 IN | OXYGEN SATURATION: 98 % | SYSTOLIC BLOOD PRESSURE: 132 MMHG | WEIGHT: 211 LBS

## 2025-08-04 DIAGNOSIS — N20.0 CALCULUS OF KIDNEY: ICD-10-CM

## 2025-08-04 DIAGNOSIS — E83.59 OTHER DISORDERS OF CALCIUM METABOLISM: ICD-10-CM

## 2025-08-04 PROCEDURE — 99213 OFFICE O/P EST LOW 20 MIN: CPT

## 2025-08-06 LAB
APPEARANCE: CLEAR
BACTERIA: NEGATIVE /HPF
BILIRUBIN URINE: NEGATIVE
BLOOD URINE: NEGATIVE
CAST: 0 /LPF
COLOR: YELLOW
EPITHELIAL CELLS: 1 /HPF
GLUCOSE QUALITATIVE U: NEGATIVE MG/DL
KETONES URINE: NEGATIVE MG/DL
LEUKOCYTE ESTERASE URINE: ABNORMAL
MICROSCOPIC-UA: NORMAL
NITRITE URINE: NEGATIVE
PH URINE: 7
PROTEIN URINE: NEGATIVE MG/DL
PSA SERPL-MCNC: 0.58 NG/ML
RED BLOOD CELLS URINE: 3 /HPF
SPECIFIC GRAVITY URINE: 1.01
URINE CYTOLOGY: NORMAL
UROBILINOGEN URINE: 1 MG/DL
WHITE BLOOD CELLS URINE: 13 /HPF

## 2025-08-13 ENCOUNTER — APPOINTMENT (OUTPATIENT)
Dept: UROLOGY | Facility: CLINIC | Age: 70
End: 2025-08-13
Payer: MEDICARE

## 2025-08-13 VITALS
HEIGHT: 68 IN | BODY MASS INDEX: 31.98 KG/M2 | RESPIRATION RATE: 14 BRPM | SYSTOLIC BLOOD PRESSURE: 152 MMHG | WEIGHT: 211 LBS | DIASTOLIC BLOOD PRESSURE: 81 MMHG | OXYGEN SATURATION: 98 % | HEART RATE: 51 BPM

## 2025-08-13 DIAGNOSIS — R31.29 OTHER MICROSCOPIC HEMATURIA: ICD-10-CM

## 2025-08-13 DIAGNOSIS — R82.81 PYURIA: ICD-10-CM

## 2025-08-13 PROCEDURE — 52000 CYSTOURETHROSCOPY: CPT

## 2025-08-19 ENCOUNTER — APPOINTMENT (OUTPATIENT)
Dept: UROLOGY | Facility: CLINIC | Age: 70
End: 2025-08-19
Payer: MEDICARE

## 2025-08-19 VITALS
RESPIRATION RATE: 15 BRPM | HEART RATE: 59 BPM | OXYGEN SATURATION: 94 % | SYSTOLIC BLOOD PRESSURE: 106 MMHG | DIASTOLIC BLOOD PRESSURE: 67 MMHG

## 2025-08-19 DIAGNOSIS — N40.1 BENIGN PROSTATIC HYPERPLASIA WITH LOWER URINARY TRACT SYMPMS: ICD-10-CM

## 2025-08-19 DIAGNOSIS — N13.8 BENIGN PROSTATIC HYPERPLASIA WITH LOWER URINARY TRACT SYMPMS: ICD-10-CM

## 2025-08-19 PROCEDURE — 76872 US TRANSRECTAL: CPT
